# Patient Record
Sex: FEMALE | Race: WHITE | NOT HISPANIC OR LATINO | Employment: PART TIME | URBAN - METROPOLITAN AREA
[De-identification: names, ages, dates, MRNs, and addresses within clinical notes are randomized per-mention and may not be internally consistent; named-entity substitution may affect disease eponyms.]

---

## 2017-03-06 ENCOUNTER — OFFICE VISIT (OUTPATIENT)
Dept: NEUROSURGERY | Facility: CLINIC | Age: 52
End: 2017-03-06

## 2017-03-06 VITALS
DIASTOLIC BLOOD PRESSURE: 86 MMHG | WEIGHT: 156.6 LBS | BODY MASS INDEX: 26.73 KG/M2 | TEMPERATURE: 98.6 F | SYSTOLIC BLOOD PRESSURE: 138 MMHG | HEIGHT: 64 IN

## 2017-03-06 DIAGNOSIS — M50.30 DEGENERATIVE DISC DISEASE, CERVICAL: Primary | ICD-10-CM

## 2017-03-06 DIAGNOSIS — M51.36 DEGENERATIVE DISC DISEASE, LUMBAR: ICD-10-CM

## 2017-03-06 DIAGNOSIS — R29.898 WEAKNESS OF EXTREMITY: ICD-10-CM

## 2017-03-06 DIAGNOSIS — M79.602 PAIN IN BOTH UPPER EXTREMITIES: ICD-10-CM

## 2017-03-06 DIAGNOSIS — M79.601 PAIN IN BOTH UPPER EXTREMITIES: ICD-10-CM

## 2017-03-06 PROCEDURE — 99203 OFFICE O/P NEW LOW 30 MIN: CPT | Performed by: NEUROLOGICAL SURGERY

## 2017-03-06 RX ORDER — GABAPENTIN 300 MG/1
600 CAPSULE ORAL
COMMUNITY

## 2017-03-06 NOTE — PROGRESS NOTES
Stacie Petersoncharlie  1965  [id number]      Chief Complaint   Patient presents with   • Neck Pain   • Leg Pain   • Back Pain       HISTORY OF PRESENT ILLNESS:  [This is a 51-year-old female who presents with a rather complicated neurological history.  She had a meningioma and osteoma resected and .  She is had pseudoseizures and been completely evaluated at the [facility].  She has pain in the cervical and lumbar region associated with fleeting paresthesia tingling and weakness in both of her upper extremities and lower extremities.  She is had a thorough evaluation by neurology which has found a EMG/NCV to be within normal limits showing no evidence of a peripheral neuropathy.  She is had cervical and lumbar MRI is done cervical years ago showing the presence of degenerative osteoarthritis, disc degeneration and facet arthrosis.  She is referred for neurosurgical consultation because of the persistence of her symptoms.    She has been to pain management in the past which have helped.  She has seen rheumatology at the [facility] but at the present time is not on NSAIDs. ]    Past Medical History   Diagnosis Date   • Cardiac murmur    • GERD (gastroesophageal reflux disease)    • History of blood transfusion    • Malignant neoplasm of skin    • Migraine    • Ovarian cancer    • Positive tuberculin test    • Seizures        Past Surgical History   Procedure Laterality Date   • Abdominoplasty     • Bladder surgery     • Brain surgery     • Breast implant surgery     • Breast lumpectomy     •  section     • Cholecystectomy     • Knee arthroscopy w/ meniscal repair       lateral   • Rotator cuff repair     • Total abdominal hysterectomy         Family History   Problem Relation Age of Onset   • Arthritis Mother    • Cancer Mother      malignant neoplasm   • Hyperlipidemia Father    • Hypertension Father    • Heart attack Father    • Osteoporosis Other    • Thyroid disease Other    • Mental illness Other         Social History     Social History   • Marital status:      Spouse name: N/A   • Number of children: N/A   • Years of education: N/A     Occupational History   • Not on file.     Social History Main Topics   • Smoking status: Never Smoker   • Smokeless tobacco: Not on file   • Alcohol use Yes      Comment: occasionally   • Drug use: No   • Sexual activity: Defer     Other Topics Concern   • Not on file     Social History Narrative       Allergies   Allergen Reactions   • Azathioprine    • Infliximab    • Ustekinumab          Current Outpatient Prescriptions:   •  ALPRAZolam (XANAX) 0.25 MG tablet, Take 1 tablet by mouth daily as needed., Disp: , Rfl:   •  cyclobenzaprine (FLEXERIL) 10 MG tablet, Take 1 tablet by mouth 2 (two) times a day as needed., Disp: , Rfl:   •  fluticasone (FLONASE) 50 MCG/ACT nasal spray, into each nostril., Disp: , Rfl:   •  gabapentin (NEURONTIN) 300 MG capsule, Take 600 mg by mouth every night at bedtime., Disp: , Rfl:   •  levothyroxine (SYNTHROID, LEVOTHROID) 75 MCG tablet, Take 1 tablet by mouth daily. Wait 1 hour for other medication or food, Disp: , Rfl:   •  omeprazole (PriLOSEC) 40 MG capsule, Take  by mouth. Take 1 tab po qd x 2 wk then qd prn reflux, Disp: , Rfl:   •  simvastatin (ZOCOR) 20 MG tablet, Take 1 tablet by mouth daily., Disp: , Rfl:   •  valACYclovir (VALTREX) 500 MG tablet, Take 500 mg by mouth 3 (three) times a day. X7 days, Disp: , Rfl:   •  venlafaxine XR (EFFEXOR-XR) 75 MG 24 hr capsule, Take 1 capsule by mouth daily., Disp: , Rfl:   •  estradiol (ESTRACE) 1 MG tablet, Take 1 tablet by mouth daily., Disp: , Rfl:     Review of Systems   Constitutional: Positive for activity change and diaphoresis. Negative for appetite change, chills, fatigue, fever and unexpected weight change.   HENT: Positive for dental problem. Negative for congestion, drooling, ear discharge, ear pain, facial swelling, hearing loss, mouth sores, nosebleeds, postnasal drip,  "rhinorrhea, sinus pressure, sneezing, sore throat, tinnitus, trouble swallowing and voice change.    Eyes: Negative for photophobia, pain, discharge, redness, itching and visual disturbance.   Respiratory: Negative for apnea, cough, choking, chest tightness, shortness of breath, wheezing and stridor.    Cardiovascular: Positive for palpitations. Negative for chest pain and leg swelling.   Gastrointestinal: Negative for abdominal distention, abdominal pain, anal bleeding, blood in stool, constipation, diarrhea, nausea, rectal pain and vomiting.   Endocrine: Positive for cold intolerance and heat intolerance. Negative for polydipsia, polyphagia and polyuria.   Genitourinary: Negative for decreased urine volume, difficulty urinating, dysuria, enuresis, flank pain, frequency, genital sores, hematuria and urgency.   Musculoskeletal: Positive for arthralgias, back pain and myalgias. Negative for gait problem, joint swelling, neck pain and neck stiffness.   Skin: Positive for rash. Negative for color change, pallor and wound.   Allergic/Immunologic: Negative for environmental allergies, food allergies and immunocompromised state.   Neurological: Positive for seizures, weakness and numbness. Negative for dizziness, tremors, syncope, facial asymmetry, speech difficulty, light-headedness and headaches.   Hematological: Negative for adenopathy. Does not bruise/bleed easily.   Psychiatric/Behavioral: Positive for dysphoric mood. Negative for agitation, behavioral problems, confusion, decreased concentration, hallucinations, self-injury, sleep disturbance and suicidal ideas. The patient is nervous/anxious. The patient is not hyperactive.    All other systems reviewed and are negative.      Neurological Examination:    Vitals:    03/06/17 0838   BP: 138/86   BP Location: Right arm   Patient Position: Sitting   Cuff Size: Adult   Temp: 98.6 °F (37 °C)   TempSrc: Temporal Artery    Weight: 156 lb 9.6 oz (71 kg)   Height: 64\" (162.6 " cm)       Mental status/speech: The patient is alert and oriented.  Speech is clear without aphysia or dysarthria.  No overt cognitive deficits.    Cranial nerve examination:    Olfaction: Smell is intact.  Vision: Vision is intact without visual field abnormalities.  Funduscopic examination is normal.  No pupillary irregularity.  Ocular motor examination: The extraocular muscles are intact.  There is no diplopia.  The pupil is round and reactive to both light and accommodation.  There is no nystagmus.  Facial movement/sensation: There is no facial weakness.  Sensation is intact in the first, second, and third divisions of the trigeminal nerve.  The corneal reflex is intact.  Auditory: Hearing is intact to finger rub bilaterally.  Cranial nerves IX, X, XI, XII: Phonation is normal.  No dysphagia.  Tongue is protruded in the midline without atrophy.  The gag reflex is intact.  Shoulder shrug is normal.    Musculoligamentous ligamentous examination: She has decreased range of motion of the cervical lumbar spine.  Straight leg raising, [place] and flip test are negative.  I am unable to elicit Babinski Adrián or clonus.  I find no focal weakness sensory loss or reflex asymmetry.  Her gait appears to be normal.    Medical Decision Making:     Diagnostic Data Set:  MRI data set from approximately 18 months prior to this encounter show the presence of degenerative disc disease in the cervical and lumbar region.  She has mild compromise of the canal.  There is no abnormal signal within the spinal cord.      Assessment:  Degenerative osteoarthritis of the spine          Recommendations:  Her symptom complex is very difficult to elucidate and correlate with the MRI findings of 1 year ago.  These probably ought to be repeated including an MRI of the brain for completeness sake.  Her examination does not point to a focal dysfunction that would be helped with surgery.  I've ordered the appropriate studies and we'll see her in  follow-up.  I will keep you informed once I have seen those.  It may well be that she has degenerative osteoarthritis and revisit to pain management with steroid injections and NSAIDs is the appropriate therapy        I greatly appreciate the opportunity to see and evaluate this individual.  If you have questions or concerns regarding issues that I may have overlooked please call me at any time: [phone].  Mateusz Dietrich M.D.  [facility]  [address] [place] [address]    Scribed for Rosendo Dietrich MD by Elijah Kern CMA. 3/6/2017  8:53 AM    I have read and concur with the information provided by the scribe.  Rosendo Dietrich MD

## 2017-03-14 ENCOUNTER — APPOINTMENT (OUTPATIENT)
Dept: MRI IMAGING | Facility: HOSPITAL | Age: 52
End: 2017-03-14
Attending: NEUROLOGICAL SURGERY

## 2017-04-27 ENCOUNTER — TRANSCRIBE ORDERS (OUTPATIENT)
Dept: ADMINISTRATIVE | Facility: HOSPITAL | Age: 52
End: 2017-04-27

## 2017-04-27 DIAGNOSIS — Z12.31 VISIT FOR SCREENING MAMMOGRAM: Primary | ICD-10-CM

## 2017-05-02 ENCOUNTER — HOSPITAL ENCOUNTER (OUTPATIENT)
Dept: MAMMOGRAPHY | Facility: HOSPITAL | Age: 52
Discharge: HOME | End: 2017-05-02
Admitting: INTERNAL MEDICINE

## 2017-05-02 DIAGNOSIS — Z12.31 VISIT FOR SCREENING MAMMOGRAM: ICD-10-CM

## 2017-05-02 PROCEDURE — 77063 BREAST TOMOSYNTHESIS BI: CPT | Performed by: RADIOLOGY

## 2017-05-02 PROCEDURE — G0202 SCR MAMMO BI INCL CAD: HCPCS

## 2017-05-02 PROCEDURE — 77067 SCR MAMMO BI INCL CAD: CPT | Performed by: RADIOLOGY

## 2017-05-02 PROCEDURE — 77063 BREAST TOMOSYNTHESIS BI: CPT

## 2017-07-24 DIAGNOSIS — R29.898 WEAKNESS OF EXTREMITY: ICD-10-CM

## 2017-07-24 DIAGNOSIS — M51.36 DEGENERATIVE DISC DISEASE, LUMBAR: Primary | ICD-10-CM

## 2017-08-03 ENCOUNTER — OFFICE VISIT (OUTPATIENT)
Dept: NEUROSURGERY | Facility: CLINIC | Age: 52
End: 2017-08-03

## 2017-08-03 ENCOUNTER — APPOINTMENT (OUTPATIENT)
Dept: MRI IMAGING | Facility: HOSPITAL | Age: 52
End: 2017-08-03
Attending: NEUROLOGICAL SURGERY

## 2017-08-03 VITALS
HEIGHT: 64 IN | SYSTOLIC BLOOD PRESSURE: 130 MMHG | DIASTOLIC BLOOD PRESSURE: 80 MMHG | WEIGHT: 158.8 LBS | TEMPERATURE: 97.6 F | BODY MASS INDEX: 27.11 KG/M2

## 2017-08-03 DIAGNOSIS — M50.30 DEGENERATIVE DISC DISEASE, CERVICAL: ICD-10-CM

## 2017-08-03 DIAGNOSIS — M51.36 DEGENERATIVE DISC DISEASE, LUMBAR: Primary | ICD-10-CM

## 2017-08-03 PROBLEM — M51.369 DEGENERATIVE DISC DISEASE, LUMBAR: Status: ACTIVE | Noted: 2017-08-03

## 2017-08-03 PROCEDURE — 99213 OFFICE O/P EST LOW 20 MIN: CPT | Performed by: NEUROLOGICAL SURGERY

## 2017-08-03 NOTE — PATIENT INSTRUCTIONS
Call Dr. Dietrich on a Monday or Tuesday.   Ask for Lily,  and leave a message for  Dr. Dietrich.  He will call you back at the end of the day as soon as he can.     [phone]

## 2017-08-03 NOTE — PROGRESS NOTES
Stacie Petersoncharlie  1965  [id number]                       CURRENT WORKING DIAGNOSIS:  [Diffuse degenerative osteoarthritis ]         MEDICAL HISTORY SINCE LAST ENCOUNTER:  [ This 52-year-old female has had issues related to disseminated degenerative osteoarthritis as well as fibromyalgia.  A recent lumbar MRI and reports for follow-up.]           Past Medical History:   Diagnosis Date   • Cardiac murmur    • GERD (gastroesophageal reflux disease)    • History of blood transfusion    • Malignant neoplasm of skin    • Migraine    • Ovarian cancer    • Positive tuberculin test    • Seizures               Past Surgical History:   Procedure Laterality Date   • ABDOMINOPLASTY     • BLADDER SURGERY     • BRAIN SURGERY     • BREAST EXCISIONAL BIOPSY Right     Pt states that she had a bx scar but it was around her areola where her reduction scars are now    • BREAST IMPLANT SURGERY     •  SECTION     • CHOLECYSTECTOMY     • KNEE ARTHROSCOPY W/ MENISCAL REPAIR      lateral   • REDUCTION MAMMAPLASTY Bilateral    • ROTATOR CUFF REPAIR     • TOTAL ABDOMINAL HYSTERECTOMY                Family History   Problem Relation Age of Onset   • Arthritis Mother    • Cancer Mother      malignant neoplasm   • Hyperlipidemia Father    • Hypertension Father    • Heart attack Father    • Osteoporosis Other    • Thyroid disease Other    • Mental illness Other    • Breast cancer Maternal Aunt 30              Social History     Social History   • Marital status:      Spouse name: N/A   • Number of children: N/A   • Years of education: N/A     Occupational History   • Not on file.     Social History Main Topics   • Smoking status: Never Smoker   • Smokeless tobacco: Not on file   • Alcohol use Yes      Comment: occasionally   • Drug use: No   • Sexual activity: Defer     Other Topics Concern   • Not on file     Social History Narrative              Allergies   Allergen Reactions   • Adhesive Tape      Surgical adhesives   •  Azathioprine    • Remicade [Infliximab]    • Ustekinumab               Current Outpatient Prescriptions:   •  ALPRAZolam (XANAX) 0.25 MG tablet, Take 1 tablet by mouth daily as needed., Disp: , Rfl:   •  cyclobenzaprine (FLEXERIL) 10 MG tablet, Take 1 tablet by mouth 2 (two) times a day as needed., Disp: , Rfl:   •  estradiol (ESTRACE) 1 MG tablet, Take 1 tablet by mouth daily., Disp: , Rfl:   •  fluticasone (FLONASE) 50 MCG/ACT nasal spray, into each nostril., Disp: , Rfl:   •  gabapentin (NEURONTIN) 300 MG capsule, Take 600 mg by mouth every night at bedtime., Disp: , Rfl:   •  levothyroxine (SYNTHROID, LEVOTHROID) 75 MCG tablet, Take 1 tablet by mouth daily. Wait 1 hour for other medication or food, Disp: , Rfl:   •  omeprazole (PriLOSEC) 40 MG capsule, Take  by mouth. Take 1 tab po qd x 2 wk then qd prn reflux, Disp: , Rfl:   •  simvastatin (ZOCOR) 20 MG tablet, Take 1 tablet by mouth daily., Disp: , Rfl:          Review of Systems   Constitutional: Negative for activity change, appetite change, chills, diaphoresis, fatigue, fever and unexpected weight change.   HENT: Positive for postnasal drip and sinus pressure. Negative for congestion, dental problem, drooling, ear discharge, ear pain, facial swelling, hearing loss, mouth sores, nosebleeds, rhinorrhea, sneezing, sore throat, tinnitus, trouble swallowing and voice change.    Eyes: Positive for photophobia, pain, itching and visual disturbance. Negative for discharge and redness.   Respiratory: Negative for apnea, cough, choking, chest tightness, shortness of breath, wheezing and stridor.    Cardiovascular: Positive for palpitations. Negative for chest pain and leg swelling.   Gastrointestinal: Negative for abdominal distention, abdominal pain, anal bleeding, blood in stool, constipation, diarrhea, nausea, rectal pain and vomiting.   Endocrine: Positive for cold intolerance and heat intolerance. Negative for polydipsia, polyphagia and polyuria.  "  Genitourinary: Negative for decreased urine volume, difficulty urinating, dysuria, enuresis, flank pain, frequency, genital sores, hematuria and urgency.   Musculoskeletal: Positive for arthralgias, back pain, myalgias, neck pain and neck stiffness. Negative for gait problem and joint swelling.   Skin: Positive for rash. Negative for color change, pallor and wound.   Allergic/Immunologic: Negative for environmental allergies, food allergies and immunocompromised state.   Neurological: Positive for dizziness, seizures, weakness, light-headedness, numbness and headaches. Negative for tremors, syncope, facial asymmetry and speech difficulty.   Hematological: Negative for adenopathy. Does not bruise/bleed easily.   Psychiatric/Behavioral: Negative for agitation, behavioral problems, confusion, decreased concentration, dysphoric mood, hallucinations, self-injury, sleep disturbance and suicidal ideas. The patient is not nervous/anxious and is not hyperactive.                Vitals:    08/03/17 1602   BP: 130/80   Temp: 97.6 °F (36.4 °C)   Weight: 158 lb 12.8 oz (72 kg)   Height: 64\" (162.6 cm)               EXAMINATION: Straight leg raising [place] flip and Layla tests are innocent.  I find no evidence of weakness sensory loss or reflex asymmetry.            MEDICAL DECISION MAKING: The MRI of the lumbar spine shows the presence of significant degenerative disc disease particularly at L4-L5 and at L5-S1 with Modic changes in each.  The neuroforamen however appear to be widely patent.  She has less significant disease at L2-L3 and L3-L4.  The cervical MRI shows the presence of multilevel degenerative disc disease.  It is most severe at C5-C6 deviating toward the right.           ASSESSMENT/DISPOSITION: Reviewed her therapeutic options.  A 2 level ALIF carry significant risk particularly in view of the fact that she has had multiple abdominal operations some of which have been related to scar tissues.  PLIF from L3 to " S1 justifiable and due to the fact she has no radiculopathy.  I have discussed the possibility of anterior cervical disc decompression at C5-C6.  Take all this under consideration and call me when she is made her decision              I APPRECIATE THE OPPORTUNITY OF THIS REFERRAL. PLEASE CALL IF ANY QUESTIONS [phone]    Scribed for Rosendo Dietrich MD by Elijah Kern CMA. 8/3/2017  4:33 PM     I have read and concur with the information provided by the scribe.  Rosendo Dietrich MD

## 2017-08-08 ENCOUNTER — TELEPHONE (OUTPATIENT)
Dept: NEUROSURGERY | Facility: CLINIC | Age: 52
End: 2017-08-08

## 2017-08-08 DIAGNOSIS — G89.29 CHRONIC BILATERAL LOW BACK PAIN WITHOUT SCIATICA: Primary | ICD-10-CM

## 2017-08-08 DIAGNOSIS — M51.36 DDD (DEGENERATIVE DISC DISEASE), LUMBAR: ICD-10-CM

## 2017-08-08 DIAGNOSIS — M50.20 HNP (HERNIATED NUCLEUS PULPOSUS), CERVICAL: ICD-10-CM

## 2017-08-08 DIAGNOSIS — M54.50 CHRONIC BILATERAL LOW BACK PAIN WITHOUT SCIATICA: Primary | ICD-10-CM

## 2017-08-08 DIAGNOSIS — M54.2 NECK PAIN, BILATERAL: ICD-10-CM

## 2017-08-09 NOTE — TELEPHONE ENCOUNTER
This is something that Dr. Dietrich likes to see.  We can either put this in His in basket today or show him tomorrow.

## 2017-08-09 NOTE — TELEPHONE ENCOUNTER
Pt returned my call stated  told her he was going to speak with his partners and get back with her in regards to surgery.      Per  OV note on 08/03/17    ASSESSMENT/DISPOSITION: Reviewed her therapeutic options.  A 2 level ALIF carry significant risk particularly in view of the fact that she has had multiple abdominal operations some of which have been related to scar tissues.  PLIF from L3 to S1 justifiable and due to the fact she has no radiculopathy.  I have discussed the possibility of anterior cervical disc decompression at C5-C6.  Take all this under consideration and call me when she is made her decision         I'm unsure if he was going to do this, or move forward with his plan?    Please advise.

## 2017-08-16 NOTE — TELEPHONE ENCOUNTER
I have placed the orders in Epic for cervical and lumbar myelograms with flexion extension x-rays of the lumbar spine and EMG and nerve conduction studies of the arms and legs.  We'll need to call the patient to inform her of Dr. Dietrich's thoughts and if she wants to proceed to please get scheduled.    Nette Hansen PA-C

## 2017-08-16 NOTE — TELEPHONE ENCOUNTER
Reviewed studies.  She will need to have myelography and post-myelography CT scanning of the cervical and lumbar area including flexion and extension with EMG and NCV to determine the degree of surgery that may or may not be necessitated, required and helpful.  I left a message at her telephone for her to call me and schedule.

## 2017-08-29 DIAGNOSIS — M54.50 CHRONIC BILATERAL LOW BACK PAIN WITHOUT SCIATICA: ICD-10-CM

## 2017-08-29 DIAGNOSIS — M50.20 HNP (HERNIATED NUCLEUS PULPOSUS), CERVICAL: Primary | ICD-10-CM

## 2017-08-29 DIAGNOSIS — M51.36 DDD (DEGENERATIVE DISC DISEASE), LUMBAR: ICD-10-CM

## 2017-08-29 DIAGNOSIS — G89.29 CHRONIC BILATERAL LOW BACK PAIN WITHOUT SCIATICA: ICD-10-CM

## 2017-09-22 ENCOUNTER — APPOINTMENT (OUTPATIENT)
Dept: NEUROLOGY | Facility: HOSPITAL | Age: 52
End: 2017-09-22

## 2017-09-22 ENCOUNTER — HOSPITAL ENCOUNTER (OUTPATIENT)
Dept: GENERAL RADIOLOGY | Facility: HOSPITAL | Age: 52
Discharge: HOME | End: 2017-09-22
Attending: NEUROLOGICAL SURGERY

## 2018-07-23 ENCOUNTER — OFFICE VISIT (OUTPATIENT)
Dept: NEUROSURGERY | Facility: CLINIC | Age: 53
End: 2018-07-23

## 2018-07-23 VITALS
OXYGEN SATURATION: 98 % | DIASTOLIC BLOOD PRESSURE: 84 MMHG | SYSTOLIC BLOOD PRESSURE: 122 MMHG | RESPIRATION RATE: 16 BRPM | HEART RATE: 87 BPM | TEMPERATURE: 97.8 F | WEIGHT: 150 LBS | HEIGHT: 64 IN | BODY MASS INDEX: 25.61 KG/M2

## 2018-07-23 DIAGNOSIS — R20.0 NUMBNESS: ICD-10-CM

## 2018-07-23 DIAGNOSIS — M50.30 DEGENERATIVE DISC DISEASE, CERVICAL: Primary | ICD-10-CM

## 2018-07-23 DIAGNOSIS — R53.1 WEAKNESS: ICD-10-CM

## 2018-07-23 DIAGNOSIS — M51.36 DEGENERATIVE DISC DISEASE, LUMBAR: ICD-10-CM

## 2018-07-23 PROCEDURE — 99213 OFFICE O/P EST LOW 20 MIN: CPT | Performed by: NEUROLOGICAL SURGERY

## 2018-07-23 RX ORDER — ESTRADIOL 2 MG/1
RING VAGINAL
Refills: 0 | COMMUNITY
Start: 2018-06-05

## 2018-07-23 RX ORDER — RANITIDINE 150 MG/1
150 TABLET ORAL 2 TIMES DAILY
COMMUNITY

## 2018-07-23 RX ORDER — RIZATRIPTAN BENZOATE 10 MG/1
10 TABLET ORAL ONCE AS NEEDED
COMMUNITY

## 2018-07-23 RX ORDER — LEFLUNOMIDE 20 MG/1
20 TABLET ORAL DAILY
COMMUNITY

## 2018-07-23 RX ORDER — DULOXETIN HYDROCHLORIDE 60 MG/1
60 CAPSULE, DELAYED RELEASE ORAL 2 TIMES DAILY
COMMUNITY

## 2018-07-23 NOTE — PROGRESS NOTES
Stacie MYLES Shilo  1965  [id number]                       CURRENT WORKING DIAGNOSIS:   Degenerative osteoarthritis         MEDICAL HISTORY SINCE LAST ENCOUNTER:   This 53-year-old female is beginning to have more issues of pain in her neck radiating to the right upper extremity.  She was seen in orthopedist who told her that she needed to have surgery on her neck and she had compression of the spinal cord.  I have seen in the past with degenerative osteoarthritic changes but have not not appreciated the seriousness of which she has been told.  She has pain in the neck rating to the right shoulder and right upper extremity.  She also has pain in her back radiating down both lower extremity is.  Studies in the passive shown instability and spinal stenosis in the lumbar region.  There are no recent studies of this, however.           Past Medical History:   Diagnosis Date   • Cardiac murmur    • GERD (gastroesophageal reflux disease)    • History of blood transfusion    • Malignant neoplasm of skin    • Migraine    • Ovarian cancer (CMS/HCC)    • Positive tuberculin test    • Seizures (CMS/HCC)               Past Surgical History:   Procedure Laterality Date   • ABDOMINOPLASTY     • BLADDER SURGERY     • BRAIN SURGERY     • BREAST EXCISIONAL BIOPSY Right     Pt states that she had a bx scar but it was around her areola where her reduction scars are now    • BREAST IMPLANT SURGERY     •  SECTION     • CHOLECYSTECTOMY     • KNEE ARTHROSCOPY W/ MENISCAL REPAIR      lateral   • REDUCTION MAMMAPLASTY Bilateral    • ROTATOR CUFF REPAIR     • TOTAL ABDOMINAL HYSTERECTOMY                Family History   Problem Relation Age of Onset   • Arthritis Mother    • Cancer Mother         malignant neoplasm   • Hyperlipidemia Father    • Hypertension Father    • Heart attack Father    • Osteoporosis Other    • Thyroid disease Other    • Mental illness Other    • Breast cancer Maternal Aunt 30              Social History      Social History   • Marital status:      Spouse name: N/A   • Number of children: N/A   • Years of education: N/A     Occupational History   • Not on file.     Social History Main Topics   • Smoking status: Never Smoker   • Smokeless tobacco: Not on file   • Alcohol use Yes      Comment: occasionally   • Drug use: No   • Sexual activity: Defer     Other Topics Concern   • Not on file     Social History Narrative   • No narrative on file              Allergies   Allergen Reactions   • Adhesive Tape      Surgical adhesives   • Azathioprine    • Remicade [Infliximab]    • Ustekinumab               Current Outpatient Prescriptions:   •  ALPRAZolam (XANAX) 0.25 MG tablet, Take 1 tablet by mouth daily as needed., Disp: , Rfl:   •  cyclobenzaprine (FLEXERIL) 10 MG tablet, Take 1 tablet by mouth 2 (two) times a day as needed., Disp: , Rfl:   •  DULoxetine (CYMBALTA) 60 MG capsule, Take 60 mg by mouth 2 (Two) Times a Day., Disp: , Rfl:   •  ESTRING 2 MG vaginal ring, INSERT 1 RING INTRAVAGINALLY EVERY 3 MONTHS AS DIRECTED, Disp: , Rfl: 0  •  fluticasone (FLONASE) 50 MCG/ACT nasal spray, into each nostril., Disp: , Rfl:   •  gabapentin (NEURONTIN) 300 MG capsule, Take 600 mg by mouth every night at bedtime., Disp: , Rfl:   •  leflunomide (ARAVA) 20 MG tablet, Take 20 mg by mouth Daily., Disp: , Rfl:   •  levothyroxine (SYNTHROID, LEVOTHROID) 75 MCG tablet, Take 1 tablet by mouth daily. Wait 1 hour for other medication or food, Disp: , Rfl:   •  raNITIdine (ZANTAC) 150 MG tablet, Take 150 mg by mouth 2 (Two) Times a Day., Disp: , Rfl:   •  rizatriptan (MAXALT) 10 MG tablet, Take 10 mg by mouth 1 (One) Time As Needed for Migraine. May repeat in 2 hours if needed, Disp: , Rfl:   •  simvastatin (ZOCOR) 20 MG tablet, Take 1 tablet by mouth daily., Disp: , Rfl:   •  omeprazole (PriLOSEC) 40 MG capsule, Take  by mouth. Take 1 tab po qd x 2 wk then qd prn reflux, Disp: , Rfl:          Review of Systems   HENT: Positive for  "postnasal drip.    Eyes: Positive for visual disturbance.   Cardiovascular: Positive for palpitations.   Endocrine: Positive for cold intolerance and heat intolerance.   Musculoskeletal: Positive for arthralgias, back pain, joint swelling, myalgias, neck pain and neck stiffness.   Skin: Positive for rash.   Allergic/Immunologic: Positive for immunocompromised state.   Neurological: Positive for dizziness, weakness, light-headedness, numbness and headaches.               Vitals:    07/23/18 1333   BP: 122/84   Pulse: 87   Resp: 16   Temp: 97.8 °F (36.6 °C)   TempSrc: Temporal Artery    SpO2: 98%   Weight: 68 kg (150 lb)   Height: 162.6 cm (64\")               EXAMINATION: She has slight decreased range of motion cervical spine.  However there is no weakness sensory loss or reflex asymmetry.  She has some pain with active and passive motion of her right shoulder.  He had decreased range of motion lumbar spine and a mildly positive straight leg raising.            MEDICAL DECISION MAKING: The MRI shows cervical spondylitic changes C5 to C6 and C6/C7.  She has a moderate canal stenosis.  She has neural foraminal narrowing more so on the left than the right however.           ASSESSMENT/DISPOSITION: [I've recommended additional studies to include cervical and lumbar myelography, post-myelography CT scanning and EMG and NCV of the right upper extremity. ]              I APPRECIATE THE OPPORTUNITY OF THIS REFERRAL. PLEASE CALL IF ANY       QUESTIONS [phone]    Scribed for Rosendo Dietrich MD by Elijah Kern CMA. 7/23/2018  1:44 PM    I have read and concur with the information provided by the scribe.  Rosendo Dietrich MD    "

## 2018-09-14 ENCOUNTER — APPOINTMENT (OUTPATIENT)
Dept: NEUROLOGY | Facility: HOSPITAL | Age: 53
End: 2018-09-14
Attending: NEUROLOGICAL SURGERY

## 2018-09-14 ENCOUNTER — APPOINTMENT (OUTPATIENT)
Dept: GENERAL RADIOLOGY | Facility: HOSPITAL | Age: 53
End: 2018-09-14
Attending: NEUROLOGICAL SURGERY

## 2018-09-21 ENCOUNTER — HOSPITAL ENCOUNTER (OUTPATIENT)
Dept: GENERAL RADIOLOGY | Facility: HOSPITAL | Age: 53
Discharge: HOME | End: 2018-09-21

## 2018-09-21 ENCOUNTER — HOSPITAL ENCOUNTER (OUTPATIENT)
Dept: GENERAL RADIOLOGY | Facility: HOSPITAL | Age: 53
Discharge: HOME | End: 2018-09-21
Attending: NEUROLOGICAL SURGERY | Admitting: NEUROLOGICAL SURGERY

## 2018-09-21 ENCOUNTER — HOSPITAL ENCOUNTER (OUTPATIENT)
Dept: NEUROLOGY | Facility: HOSPITAL | Age: 53
Discharge: HOME | End: 2018-09-21
Attending: NEUROLOGICAL SURGERY

## 2018-09-21 ENCOUNTER — HOSPITAL ENCOUNTER (OUTPATIENT)
Dept: CT IMAGING | Facility: HOSPITAL | Age: 53
Discharge: HOME | End: 2018-09-21

## 2018-09-21 VITALS
HEART RATE: 91 BPM | BODY MASS INDEX: 26.65 KG/M2 | TEMPERATURE: 97.9 F | RESPIRATION RATE: 18 BRPM | OXYGEN SATURATION: 95 % | SYSTOLIC BLOOD PRESSURE: 107 MMHG | HEIGHT: 63 IN | WEIGHT: 150.4 LBS | DIASTOLIC BLOOD PRESSURE: 82 MMHG

## 2018-09-21 PROCEDURE — 72132 CT LUMBAR SPINE W/DYE: CPT

## 2018-09-21 PROCEDURE — 63710000001 DIPHENHYDRAMINE PER 50 MG: Performed by: NEUROLOGICAL SURGERY

## 2018-09-21 PROCEDURE — 95910 NRV CNDJ TEST 7-8 STUDIES: CPT

## 2018-09-21 PROCEDURE — 72126 CT NECK SPINE W/DYE: CPT

## 2018-09-21 PROCEDURE — 62305 MYELOGRAPHY LUMBAR INJECTION: CPT

## 2018-09-21 PROCEDURE — 95886 MUSC TEST DONE W/N TEST COMP: CPT

## 2018-09-21 PROCEDURE — 72120 X-RAY BEND ONLY L-S SPINE: CPT

## 2018-09-21 PROCEDURE — 72240 MYELOGRAPHY NECK SPINE: CPT

## 2018-09-21 PROCEDURE — 25010000002 IOPAMIDOL 61 % SOLUTION: Performed by: NEUROLOGICAL SURGERY

## 2018-09-21 RX ORDER — LIDOCAINE HYDROCHLORIDE 10 MG/ML
10 INJECTION, SOLUTION EPIDURAL; INFILTRATION; INTRACAUDAL; PERINEURAL ONCE
Status: COMPLETED | OUTPATIENT
Start: 2018-09-21 | End: 2018-09-21

## 2018-09-21 RX ORDER — DIAZEPAM 5 MG/1
10 TABLET ORAL ONCE AS NEEDED
Status: DISCONTINUED | OUTPATIENT
Start: 2018-09-21 | End: 2018-09-22 | Stop reason: HOSPADM

## 2018-09-21 RX ORDER — DIPHENHYDRAMINE HCL 50 MG
50 CAPSULE ORAL ONCE
Status: COMPLETED | OUTPATIENT
Start: 2018-09-21 | End: 2018-09-21

## 2018-09-21 RX ADMIN — IOPAMIDOL 12 ML: 612 INJECTION, SOLUTION INTRAVENOUS at 10:25

## 2018-09-21 RX ADMIN — LIDOCAINE HYDROCHLORIDE 10 ML: 10 INJECTION, SOLUTION EPIDURAL; INFILTRATION; INTRACAUDAL; PERINEURAL at 10:25

## 2018-09-21 RX ADMIN — DIAZEPAM 10 MG: 5 TABLET ORAL at 08:03

## 2018-09-21 RX ADMIN — DIPHENHYDRAMINE HYDROCHLORIDE 50 MG: 50 CAPSULE ORAL at 08:04

## 2018-09-21 NOTE — NURSING NOTE
Dr Dietrich called that pt could be discharged and he will call results. Given printed and oral discharge instructions. Discharged per wheelchair to front entrance with  driving.

## 2018-09-21 NOTE — DISCHARGE INSTR - ACTIVITY
Rest quietly at home today.  You may resume light activity tomorrow as tolerated.  You may shower and remove the bandage tomorrow.  Lie in bed, on a couch, or in a recliner in the reclined position until tomorrow am.

## 2018-09-21 NOTE — POST-PROCEDURE NOTE
Radiology Procedure    Pre-procedure: procedure, risks discussed with patient. Patient indicated understanding and consented to procedure     Procedure Performed: total myelogram     IV Sedation and/or Anesthesia:  No    Complications: none    Preliminary Findings: pending    Specimen Removed: none    Estimated Blood Loss:  0ml    Post-Procedure Diagnosis: pending    Post-Procedure Plan: ct C, L spine, encourage fluids, bed rest x 2 hours    Standard Discharge Instructions Given:yes     ISMAEL Saunders  09/21/18  10:07 AM

## 2018-09-24 ENCOUNTER — TELEPHONE (OUTPATIENT)
Dept: INFUSION THERAPY | Facility: HOSPITAL | Age: 53
End: 2018-09-24

## 2018-09-24 NOTE — PROGRESS NOTES
NEUROSURGERY POST MYELOGRAM NOTE:    This is a 51-year-old female who presents with a rather complicated neurological history.  She had a meningioma and osteoma resected and 2004.  She is had pseudoseizures and been completely evaluated at the [facility].  She has pain in the cervical and lumbar region associated with fleeting paresthesia tingling and weakness in both of her upper extremities and lower extremities.  She is had a thorough evaluation by neurology which has found a EMG/NCV to be within normal limits showing no evidence of a peripheral neuropathy.  She is had cervical and lumbar MRI is done cervical years ago showing the presence of degenerative osteoarthritis, disc degeneration and facet arthrosis.  She is referred for neurosurgical consultation because of the persistence of her symptoms.     She has been to pain management in the past which have helped.  She has seen rheumatology at the [facility] but at the present time is not on NSAIDs    EMG/NCV IMPRESSION:      Median neuropathy at the wrist bilaterally, mild (carpal tunnel)      No electrophysiologic evidence for radiculopathy is seen in either arm    MYELOGRAM ( CERVICAL AND LUMBAR)     Mild canal stenosis at C5-6 and C6-7. Borderline canal  stenosis at C4-5; mild multilevel bony foraminal narrowing. Please see  above complete description by disc level    Multilevel, generally mild canal stenosis, from L2-3 through  L5-S1 due to disc protrusion. No large focal HNP, high-grade canal or  foraminal stenosis, acute or healing trauma is seen. Advanced L4-5  degenerative disc change is noted.    IMPRESSION/RECOMMENDATION:    These studies indicate that surgery is not a therapeutic option.  Nevertheless, she does have diffuse and widespread degenerative osteoarthritis of the cervical and lumbar spine.  She is under the care of rheumatology which seems most appropriate.  I will see her on as-needed basis.  I reviewed these data with her in detail.

## 2019-01-11 ENCOUNTER — TELEPHONE (OUTPATIENT)
Dept: NEUROSURGERY | Facility: CLINIC | Age: 54
End: 2019-01-11

## 2020-11-04 NOTE — TELEPHONE ENCOUNTER
Provider:  Karlo  Caller: self  Time of call: 9:24    Phone #:  [phone]  Surgery: n/a   Surgery Date:    Last visit: 7/23/18    Next visit: VINOD WEBSTER:         Reason for call:       Patient requests that a medical release form be faxed to her at [phone].    Form faxed successfully       
Stable

## 2021-10-29 ENCOUNTER — TRANSCRIBE ORDERS (OUTPATIENT)
Dept: ADMINISTRATIVE | Facility: HOSPITAL | Age: 56
End: 2021-10-29

## 2021-10-29 DIAGNOSIS — Z11.59 ENCOUNTER FOR SCREENING FOR VIRAL DISEASE: ICD-10-CM

## 2021-10-29 DIAGNOSIS — Z11.59 SCREENING EXAMINATION FOR POLIOMYELITIS: Primary | ICD-10-CM

## 2021-11-08 ENCOUNTER — LAB (OUTPATIENT)
Dept: PREADMISSION TESTING | Facility: HOSPITAL | Age: 56
End: 2021-11-08

## 2021-11-08 LAB — SARS-COV-2 RNA PNL SPEC NAA+PROBE: NOT DETECTED

## 2021-11-08 PROCEDURE — U0004 COV-19 TEST NON-CDC HGH THRU: HCPCS

## 2021-11-08 PROCEDURE — C9803 HOPD COVID-19 SPEC COLLECT: HCPCS

## 2021-11-10 ENCOUNTER — LAB REQUISITION (OUTPATIENT)
Dept: LAB | Facility: HOSPITAL | Age: 56
End: 2021-11-10

## 2021-11-10 DIAGNOSIS — D17.1 BENIGN LIPOMATOUS NEOPLASM OF SKIN AND SUBCUTANEOUS TISSUE OF TRUNK: ICD-10-CM

## 2021-11-10 PROCEDURE — 88304 TISSUE EXAM BY PATHOLOGIST: CPT | Performed by: SURGERY

## 2021-11-11 LAB
CYTO UR: NORMAL
LAB AP CASE REPORT: NORMAL
LAB AP CLINICAL INFORMATION: NORMAL
PATH REPORT.FINAL DX SPEC: NORMAL
PATH REPORT.GROSS SPEC: NORMAL

## 2025-07-22 ENCOUNTER — APPOINTMENT (OUTPATIENT)
Facility: HOSPITAL | Age: 60
End: 2025-07-22
Payer: MEDICARE

## 2025-07-22 ENCOUNTER — HOSPITAL ENCOUNTER (INPATIENT)
Facility: HOSPITAL | Age: 60
LOS: 1 days | Discharge: HOME | End: 2025-07-23
Attending: EMERGENCY MEDICINE | Admitting: INTERNAL MEDICINE
Payer: MEDICARE

## 2025-07-22 DIAGNOSIS — J36 TONSILLAR ABSCESS: Primary | ICD-10-CM

## 2025-07-22 PROBLEM — L40.50 ARTHROPATHIC PSORIASIS, UNSPECIFIED: Chronic | Status: ACTIVE | Noted: 2021-11-04

## 2025-07-22 PROBLEM — D84.9 IMMUNOCOMPROMISED STATE: Chronic | Status: ACTIVE | Noted: 2020-02-11

## 2025-07-22 PROBLEM — M35.00 SJOGREN SYNDROME, UNSPECIFIED: Chronic | Status: ACTIVE | Noted: 2021-05-06

## 2025-07-22 PROBLEM — L40.0 PLAQUE PSORIASIS: Status: ACTIVE | Noted: 2023-12-19

## 2025-07-22 PROBLEM — I34.1 MITRAL VALVE PROLAPSE: Status: ACTIVE | Noted: 2023-02-14

## 2025-07-22 LAB
ALBUMIN SERPL-MCNC: 4.6 G/DL (ref 3.5–5.2)
ALBUMIN/GLOB SERPL: 2.3 G/DL
ALP SERPL-CCNC: 69 U/L (ref 39–117)
ALT SERPL W P-5'-P-CCNC: 38 U/L (ref 1–33)
ANION GAP SERPL CALCULATED.3IONS-SCNC: 14 MMOL/L (ref 5–15)
AST SERPL-CCNC: 44 U/L (ref 1–32)
BASOPHILS # BLD AUTO: 0.03 10*3/MM3 (ref 0–0.2)
BASOPHILS NFR BLD AUTO: 0.5 % (ref 0–1.5)
BILIRUB SERPL-MCNC: 0.6 MG/DL (ref 0–1.2)
BUN SERPL-MCNC: 19.2 MG/DL (ref 8–23)
BUN/CREAT SERPL: 21.3 (ref 7–25)
CALCIUM SPEC-SCNC: 9.9 MG/DL (ref 8.6–10.5)
CHLORIDE SERPL-SCNC: 106 MMOL/L (ref 98–107)
CO2 SERPL-SCNC: 24 MMOL/L (ref 22–29)
CREAT SERPL-MCNC: 0.9 MG/DL (ref 0.57–1)
D-LACTATE SERPL-SCNC: 0.7 MMOL/L (ref 0.5–2)
DEPRECATED RDW RBC AUTO: 49.6 FL (ref 37–54)
EGFRCR SERPLBLD CKD-EPI 2021: 73.3 ML/MIN/1.73
EOSINOPHIL # BLD AUTO: 0.02 10*3/MM3 (ref 0–0.4)
EOSINOPHIL NFR BLD AUTO: 0.3 % (ref 0.3–6.2)
ERYTHROCYTE [DISTWIDTH] IN BLOOD BY AUTOMATED COUNT: 15.1 % (ref 12.3–15.4)
GLOBULIN UR ELPH-MCNC: 2 GM/DL
GLUCOSE SERPL-MCNC: 93 MG/DL (ref 65–99)
HCT VFR BLD AUTO: 35.1 % (ref 34–46.6)
HGB BLD-MCNC: 11.5 G/DL (ref 12–15.9)
IMM GRANULOCYTES # BLD AUTO: 0 10*3/MM3 (ref 0–0.05)
IMM GRANULOCYTES NFR BLD AUTO: 0 % (ref 0–0.5)
LYMPHOCYTES # BLD AUTO: 1.69 10*3/MM3 (ref 0.7–3.1)
LYMPHOCYTES NFR BLD AUTO: 27.3 % (ref 19.6–45.3)
MCH RBC QN AUTO: 28.9 PG (ref 26.6–33)
MCHC RBC AUTO-ENTMCNC: 32.8 G/DL (ref 31.5–35.7)
MCV RBC AUTO: 88.2 FL (ref 79–97)
MONOCYTES # BLD AUTO: 0.48 10*3/MM3 (ref 0.1–0.9)
MONOCYTES NFR BLD AUTO: 7.7 % (ref 5–12)
NEUTROPHILS NFR BLD AUTO: 3.98 10*3/MM3 (ref 1.7–7)
NEUTROPHILS NFR BLD AUTO: 64.2 % (ref 42.7–76)
PLATELET # BLD AUTO: 211 10*3/MM3 (ref 140–450)
PMV BLD AUTO: 11.8 FL (ref 6–12)
POTASSIUM SERPL-SCNC: 4 MMOL/L (ref 3.5–5.2)
PROCALCITONIN SERPL-MCNC: 0.02 NG/ML (ref 0–0.25)
PROT SERPL-MCNC: 6.6 G/DL (ref 6–8.5)
RBC # BLD AUTO: 3.98 10*6/MM3 (ref 3.77–5.28)
SODIUM SERPL-SCNC: 144 MMOL/L (ref 136–145)
WBC NRBC COR # BLD AUTO: 6.2 10*3/MM3 (ref 3.4–10.8)

## 2025-07-22 PROCEDURE — 84145 PROCALCITONIN (PCT): CPT | Performed by: EMERGENCY MEDICINE

## 2025-07-22 PROCEDURE — 85025 COMPLETE CBC W/AUTO DIFF WBC: CPT | Performed by: EMERGENCY MEDICINE

## 2025-07-22 PROCEDURE — 25010000002 HYDROMORPHONE PER 4 MG: Performed by: EMERGENCY MEDICINE

## 2025-07-22 PROCEDURE — 25010000002 DIPHENHYDRAMINE PER 50 MG: Performed by: EMERGENCY MEDICINE

## 2025-07-22 PROCEDURE — 86160 COMPLEMENT ANTIGEN: CPT | Performed by: EMERGENCY MEDICINE

## 2025-07-22 PROCEDURE — 25010000002 DEXAMETHASONE PER 1 MG: Performed by: EMERGENCY MEDICINE

## 2025-07-22 PROCEDURE — 25010000002 KETOROLAC TROMETHAMINE PER 15 MG: Performed by: EMERGENCY MEDICINE

## 2025-07-22 PROCEDURE — 80053 COMPREHEN METABOLIC PANEL: CPT | Performed by: EMERGENCY MEDICINE

## 2025-07-22 PROCEDURE — 25010000002 AMPICILLIN-SULBACTAM PER 1.5 G: Performed by: STUDENT IN AN ORGANIZED HEALTH CARE EDUCATION/TRAINING PROGRAM

## 2025-07-22 PROCEDURE — G0378 HOSPITAL OBSERVATION PER HR: HCPCS

## 2025-07-22 PROCEDURE — 83605 ASSAY OF LACTIC ACID: CPT | Performed by: EMERGENCY MEDICINE

## 2025-07-22 PROCEDURE — 70491 CT SOFT TISSUE NECK W/DYE: CPT

## 2025-07-22 PROCEDURE — 25810000003 LACTATED RINGERS SOLUTION: Performed by: EMERGENCY MEDICINE

## 2025-07-22 PROCEDURE — 99222 1ST HOSP IP/OBS MODERATE 55: CPT | Performed by: STUDENT IN AN ORGANIZED HEALTH CARE EDUCATION/TRAINING PROGRAM

## 2025-07-22 PROCEDURE — 25510000001 IOPAMIDOL 61 % SOLUTION: Performed by: EMERGENCY MEDICINE

## 2025-07-22 PROCEDURE — 99285 EMERGENCY DEPT VISIT HI MDM: CPT | Performed by: EMERGENCY MEDICINE

## 2025-07-22 PROCEDURE — 25010000002 AMPICILLIN-SULBACTAM PER 1.5 G: Performed by: EMERGENCY MEDICINE

## 2025-07-22 RX ORDER — ACETAMINOPHEN 160 MG/5ML
650 SOLUTION ORAL EVERY 4 HOURS PRN
Status: DISCONTINUED | OUTPATIENT
Start: 2025-07-22 | End: 2025-07-23 | Stop reason: HOSPADM

## 2025-07-22 RX ORDER — BISACODYL 5 MG/1
5 TABLET, DELAYED RELEASE ORAL DAILY PRN
Status: DISCONTINUED | OUTPATIENT
Start: 2025-07-22 | End: 2025-07-23 | Stop reason: HOSPADM

## 2025-07-22 RX ORDER — SODIUM CHLORIDE 9 MG/ML
40 INJECTION, SOLUTION INTRAVENOUS AS NEEDED
Status: DISCONTINUED | OUTPATIENT
Start: 2025-07-22 | End: 2025-07-23 | Stop reason: HOSPADM

## 2025-07-22 RX ORDER — PANTOPRAZOLE SODIUM 40 MG/1
40 TABLET, DELAYED RELEASE ORAL
Status: DISCONTINUED | OUTPATIENT
Start: 2025-07-23 | End: 2025-07-23 | Stop reason: HOSPADM

## 2025-07-22 RX ORDER — IPRATROPIUM BROMIDE AND ALBUTEROL SULFATE 2.5; .5 MG/3ML; MG/3ML
3 SOLUTION RESPIRATORY (INHALATION) EVERY 4 HOURS PRN
Status: DISCONTINUED | OUTPATIENT
Start: 2025-07-22 | End: 2025-07-23 | Stop reason: HOSPADM

## 2025-07-22 RX ORDER — SODIUM CHLORIDE 0.9 % (FLUSH) 0.9 %
10 SYRINGE (ML) INJECTION EVERY 12 HOURS SCHEDULED
Status: DISCONTINUED | OUTPATIENT
Start: 2025-07-22 | End: 2025-07-23 | Stop reason: HOSPADM

## 2025-07-22 RX ORDER — POLYETHYLENE GLYCOL 3350 17 G/17G
17 POWDER, FOR SOLUTION ORAL DAILY PRN
Status: DISCONTINUED | OUTPATIENT
Start: 2025-07-22 | End: 2025-07-23 | Stop reason: HOSPADM

## 2025-07-22 RX ORDER — SODIUM CHLORIDE, SODIUM LACTATE, POTASSIUM CHLORIDE, CALCIUM CHLORIDE 600; 310; 30; 20 MG/100ML; MG/100ML; MG/100ML; MG/100ML
100 INJECTION, SOLUTION INTRAVENOUS CONTINUOUS
Status: ACTIVE | OUTPATIENT
Start: 2025-07-22 | End: 2025-07-23

## 2025-07-22 RX ORDER — HYDROMORPHONE HYDROCHLORIDE 1 MG/ML
0.5 INJECTION, SOLUTION INTRAMUSCULAR; INTRAVENOUS; SUBCUTANEOUS ONCE
Refills: 0 | Status: COMPLETED | OUTPATIENT
Start: 2025-07-22 | End: 2025-07-22

## 2025-07-22 RX ORDER — LEFLUNOMIDE 10 MG/1
20 TABLET ORAL DAILY
Status: DISCONTINUED | OUTPATIENT
Start: 2025-07-23 | End: 2025-07-23 | Stop reason: HOSPADM

## 2025-07-22 RX ORDER — DULOXETIN HYDROCHLORIDE 60 MG/1
60 CAPSULE, DELAYED RELEASE ORAL 2 TIMES DAILY
Status: DISCONTINUED | OUTPATIENT
Start: 2025-07-22 | End: 2025-07-23 | Stop reason: HOSPADM

## 2025-07-22 RX ORDER — KETOROLAC TROMETHAMINE 15 MG/ML
15 INJECTION, SOLUTION INTRAMUSCULAR; INTRAVENOUS ONCE
Status: COMPLETED | OUTPATIENT
Start: 2025-07-22 | End: 2025-07-22

## 2025-07-22 RX ORDER — DIPHENHYDRAMINE HYDROCHLORIDE 50 MG/ML
50 INJECTION, SOLUTION INTRAMUSCULAR; INTRAVENOUS ONCE
Status: COMPLETED | OUTPATIENT
Start: 2025-07-22 | End: 2025-07-22

## 2025-07-22 RX ORDER — ROSUVASTATIN CALCIUM 10 MG/1
5 TABLET, COATED ORAL NIGHTLY
Status: DISCONTINUED | OUTPATIENT
Start: 2025-07-22 | End: 2025-07-23 | Stop reason: HOSPADM

## 2025-07-22 RX ORDER — DOXEPIN HYDROCHLORIDE 10 MG/1
10 CAPSULE ORAL NIGHTLY
Status: DISCONTINUED | OUTPATIENT
Start: 2025-07-22 | End: 2025-07-23 | Stop reason: HOSPADM

## 2025-07-22 RX ORDER — LEVOTHYROXINE SODIUM 75 UG/1
75 TABLET ORAL
Status: DISCONTINUED | OUTPATIENT
Start: 2025-07-23 | End: 2025-07-23 | Stop reason: HOSPADM

## 2025-07-22 RX ORDER — SODIUM CHLORIDE 0.9 % (FLUSH) 0.9 %
10 SYRINGE (ML) INJECTION AS NEEDED
Status: DISCONTINUED | OUTPATIENT
Start: 2025-07-22 | End: 2025-07-23 | Stop reason: HOSPADM

## 2025-07-22 RX ORDER — IOPAMIDOL 612 MG/ML
85 INJECTION, SOLUTION INTRAVASCULAR
Status: COMPLETED | OUTPATIENT
Start: 2025-07-22 | End: 2025-07-22

## 2025-07-22 RX ORDER — ACETAMINOPHEN 325 MG/1
650 TABLET ORAL EVERY 4 HOURS PRN
Status: DISCONTINUED | OUTPATIENT
Start: 2025-07-22 | End: 2025-07-23 | Stop reason: HOSPADM

## 2025-07-22 RX ORDER — DEXAMETHASONE SODIUM PHOSPHATE 4 MG/ML
8 INJECTION, SOLUTION INTRA-ARTICULAR; INTRALESIONAL; INTRAMUSCULAR; INTRAVENOUS; SOFT TISSUE ONCE
Status: COMPLETED | OUTPATIENT
Start: 2025-07-22 | End: 2025-07-22

## 2025-07-22 RX ORDER — MORPHINE SULFATE 2 MG/ML
1 INJECTION, SOLUTION INTRAMUSCULAR; INTRAVENOUS EVERY 4 HOURS PRN
Status: DISCONTINUED | OUTPATIENT
Start: 2025-07-22 | End: 2025-07-23 | Stop reason: HOSPADM

## 2025-07-22 RX ORDER — FLUTICASONE PROPIONATE 50 MCG
2 SPRAY, SUSPENSION (ML) NASAL AS NEEDED
Status: DISCONTINUED | OUTPATIENT
Start: 2025-07-22 | End: 2025-07-23 | Stop reason: HOSPADM

## 2025-07-22 RX ORDER — ACETAMINOPHEN 650 MG/1
650 SUPPOSITORY RECTAL EVERY 4 HOURS PRN
Status: DISCONTINUED | OUTPATIENT
Start: 2025-07-22 | End: 2025-07-23 | Stop reason: HOSPADM

## 2025-07-22 RX ORDER — BISACODYL 10 MG
10 SUPPOSITORY, RECTAL RECTAL DAILY PRN
Status: DISCONTINUED | OUTPATIENT
Start: 2025-07-22 | End: 2025-07-23 | Stop reason: HOSPADM

## 2025-07-22 RX ORDER — ROSUVASTATIN CALCIUM 5 MG/1
5 TABLET, COATED ORAL NIGHTLY
COMMUNITY

## 2025-07-22 RX ORDER — NALOXONE HCL 0.4 MG/ML
0.4 VIAL (ML) INJECTION
Status: DISCONTINUED | OUTPATIENT
Start: 2025-07-22 | End: 2025-07-23 | Stop reason: HOSPADM

## 2025-07-22 RX ORDER — AMOXICILLIN 250 MG
2 CAPSULE ORAL 2 TIMES DAILY PRN
Status: DISCONTINUED | OUTPATIENT
Start: 2025-07-22 | End: 2025-07-23 | Stop reason: HOSPADM

## 2025-07-22 RX ORDER — DOXEPIN HYDROCHLORIDE 10 MG/1
10 CAPSULE ORAL NIGHTLY
COMMUNITY

## 2025-07-22 RX ORDER — CHOLECALCIFEROL (VITAMIN D3) 25 MCG
2000 TABLET ORAL DAILY
COMMUNITY

## 2025-07-22 RX ORDER — GABAPENTIN 300 MG/1
600 CAPSULE ORAL NIGHTLY
Status: DISCONTINUED | OUTPATIENT
Start: 2025-07-22 | End: 2025-07-23 | Stop reason: HOSPADM

## 2025-07-22 RX ORDER — TRANEXAMIC ACID 10 MG/ML
1000 INJECTION, SOLUTION INTRAVENOUS ONCE
Status: COMPLETED | OUTPATIENT
Start: 2025-07-22 | End: 2025-07-22

## 2025-07-22 RX ORDER — LIDOCAINE HYDROCHLORIDE 20 MG/ML
10 SOLUTION OROPHARYNGEAL ONCE
Status: COMPLETED | OUTPATIENT
Start: 2025-07-22 | End: 2025-07-22

## 2025-07-22 RX ORDER — LIDOCAINE HYDROCHLORIDE 20 MG/ML
10 SOLUTION OROPHARYNGEAL
Status: DISCONTINUED | OUTPATIENT
Start: 2025-07-22 | End: 2025-07-23 | Stop reason: HOSPADM

## 2025-07-22 RX ADMIN — TRANEXAMIC ACID 1000 MG: 10 INJECTION, SOLUTION INTRAVENOUS at 13:49

## 2025-07-22 RX ADMIN — Medication 10 MG: at 23:42

## 2025-07-22 RX ADMIN — DIPHENHYDRAMINE HYDROCHLORIDE 50 MG: 50 INJECTION INTRAMUSCULAR; INTRAVENOUS at 13:31

## 2025-07-22 RX ADMIN — AMPICILLIN SODIUM AND SULBACTAM SODIUM 3 G: 2; 1 INJECTION, POWDER, FOR SOLUTION INTRAMUSCULAR; INTRAVENOUS at 22:29

## 2025-07-22 RX ADMIN — SODIUM CHLORIDE, POTASSIUM CHLORIDE, SODIUM LACTATE AND CALCIUM CHLORIDE 500 ML: 600; 310; 30; 20 INJECTION, SOLUTION INTRAVENOUS at 13:36

## 2025-07-22 RX ADMIN — HYDROMORPHONE HYDROCHLORIDE 0.5 MG: 1 INJECTION, SOLUTION INTRAMUSCULAR; INTRAVENOUS; SUBCUTANEOUS at 13:33

## 2025-07-22 RX ADMIN — GABAPENTIN 600 MG: 300 CAPSULE ORAL at 23:42

## 2025-07-22 RX ADMIN — DEXAMETHASONE SODIUM PHOSPHATE 8 MG: 4 INJECTION, SOLUTION INTRAMUSCULAR; INTRAVENOUS at 13:28

## 2025-07-22 RX ADMIN — AMPICILLIN SODIUM AND SULBACTAM SODIUM 3 G: 2; 1 INJECTION, POWDER, FOR SOLUTION INTRAMUSCULAR; INTRAVENOUS at 15:36

## 2025-07-22 RX ADMIN — LIDOCAINE HYDROCHLORIDE 10 ML: 20 SOLUTION ORAL at 15:39

## 2025-07-22 RX ADMIN — HYDROMORPHONE HYDROCHLORIDE 0.5 MG: 1 INJECTION, SOLUTION INTRAMUSCULAR; INTRAVENOUS; SUBCUTANEOUS at 15:42

## 2025-07-22 RX ADMIN — IOPAMIDOL 75 ML: 612 INJECTION, SOLUTION INTRAVENOUS at 14:01

## 2025-07-22 RX ADMIN — KETOROLAC TROMETHAMINE 15 MG: 15 INJECTION INTRAMUSCULAR at 15:40

## 2025-07-22 NOTE — ED NOTES
Stacie Lao    Nursing Report ED to Floor:  Mental status: a/o x 4  Ambulatory status: at will  Oxygen Therapy:  ra  Cardiac Rhythm: sinus tach  Admitted from: home  Safety Concerns:  fall risk  Precautions: none  Social Issues: unknown  ED Room #:  21    ED Nurse Phone Extension - [phone]      HPI:   Chief Complaint   Patient presents with    Oral Swelling       Past Medical History:  Past Medical History:   Diagnosis Date    Cardiac murmur     GERD (gastroesophageal reflux disease)     History of blood transfusion     Malignant neoplasm of skin     Migraine     Ovarian cancer     Positive tuberculin test     Seizures         Past Surgical History:  Past Surgical History:   Procedure Laterality Date    ABDOMINOPLASTY      BLADDER SURGERY      BRAIN SURGERY      BREAST EXCISIONAL BIOPSY Right     Pt states that she had a bx scar but it was around her areola where her reduction scars are now     BREAST IMPLANT SURGERY       SECTION      CHOLECYSTECTOMY      KNEE ARTHROSCOPY W/ MENISCAL REPAIR      lateral    REDUCTION MAMMAPLASTY Bilateral 2001    ROTATOR CUFF REPAIR      TOTAL ABDOMINAL HYSTERECTOMY          Admitting Doctor:   No admitting provider for patient encounter.    Consulting Provider(s):  Consults       No orders found from 2025 to 2025.             Admitting Diagnosis:   The encounter diagnosis was Tonsillar abscess.    Most Recent Vitals:   Vitals:    25 1543 25 1553 25 1600 25 1630   BP: 150/97   154/86   Pulse: 115 107 115 105   Resp:       Temp:       TempSrc:       SpO2: 94% 92% 90%    Weight:       Height:           Active LDAs/IV Access:   Lines, Drains & Airways       Active LDAs       Name Placement date Placement time Site Days    Peripheral IV 25 1328 20 G Right Antecubital 25  1328  Antecubital  less than 1    Peripheral IV 25 1543 20 G Anterior;Left Forearm 25  1543  Forearm  less than 1                    Labs  (abnormal labs have a star):   Labs Reviewed   COMPREHENSIVE METABOLIC PANEL - Abnormal; Notable for the following components:       Result Value    ALT (SGPT) 38 (*)     AST (SGOT) 44 (*)     All other components within normal limits    Narrative:     GFR Categories in Chronic Kidney Disease (CKD)              GFR Category          GFR (mL/min/1.73)    Interpretation  G1                    90 or greater        Normal or high (1)  G2                    60-89                Mild decrease (1)  G3a                   45-59                Mild to moderate decrease  G3b                   30-44                Moderate to severe decrease  G4                    15-29                Severe decrease  G5                    14 or less           Kidney failure    (1)In the absence of evidence of kidney disease, neither GFR category G1 or G2 fulfill the criteria for CKD.    eGFR calculation 2021 CKD-EPI creatinine equation, which does not include race as a factor   CBC WITH AUTO DIFFERENTIAL - Abnormal; Notable for the following components:    Hemoglobin 11.5 (*)     All other components within normal limits   LACTIC ACID, PLASMA - Normal   PROCALCITONIN - Normal   C1 ESTERASE INHIBITOR, SERUM   C4 COMPLEMENT   CBC AND DIFFERENTIAL    Narrative:     The following orders were created for panel order CBC & Differential.  Procedure                               Abnormality         Status                     ---------                               -----------         ------                     CBC Auto Differential[[id number]]        Abnormal            Final result                 Please view results for these tests on the individual orders.       Meds Given in ED:   Medications   EPINEPHrine (ADRENALIN) injection 0.5 mg (0 mg Intramuscular Hold 7/22/25 1620)   dexAMETHasone (DECADRON) injection 8 mg (8 mg Intravenous Given 7/22/25 1328)   diphenhydrAMINE (BENADRYL) injection 50 mg (50 mg Intravenous Given 7/22/25 1331)   lactated  ringers bolus 500 mL (0 mL Intravenous Stopped 7/22/25 1416)   HYDROmorphone (DILAUDID) injection 0.5 mg (0.5 mg Intravenous Given 7/22/25 1333)   tranexamic acid 1000 mg in 100 mL 0.7% NaCl infusion (premix) (1,000 mg Intravenous New Bag 7/22/25 1349)   iopamidol (ISOVUE-300) 61 % injection 85 mL (75 mL Intravenous Given 7/22/25 1401)   ampicillin-sulbactam (UNASYN) 3 g in sodium chloride 0.9 % 100 mL MBP (0 g Intravenous Stopped 7/22/25 1613)   HYDROmorphone (DILAUDID) injection 0.5 mg (0.5 mg Intravenous Given 7/22/25 1542)   ketorolac (TORADOL) injection 15 mg (15 mg Intravenous Given 7/22/25 1540)   Lidocaine Viscous HCl (XYLOCAINE) 2 % solution 10 mL (10 mL Mouth/Throat Given 7/22/25 1539)           Last NIH score:                                                          Dysphagia screening results:        Alie Coma Scale:  No data recorded     CIWA:        Restraint Type:            Isolation Status:  No active isolations

## 2025-07-22 NOTE — FSED PROVIDER NOTE
Subjective  History of Present Illness:    Presents emergency department with pain on the right side of her throat.  Started at 1100 hrs. while she was working.  Has been gradually worsening.  Pain with swallowing.  No fever.  She does not take ACE inhibitor.  She has had no other swelling throughout her head and face.  She went to urgent care was told she had negative COVID and negative strep.  They recommended she come to the emergency department for further evaluation    Nurses Notes reviewed and agree, including vitals, allergies, social history and prior medical history.     REVIEW OF SYSTEMS: All systems reviewed and not pertinent unless noted.    Past Medical History:   Diagnosis Date    Cardiac murmur     GERD (gastroesophageal reflux disease)     History of blood transfusion     Malignant neoplasm of skin     Migraine     Ovarian cancer     Positive tuberculin test     Seizures        Allergies:    Adhesive tape, Azathioprine, Remicade [infliximab], and Ustekinumab (murine)      Past Surgical History:   Procedure Laterality Date    ABDOMINOPLASTY      BLADDER SURGERY      BRAIN SURGERY      BREAST EXCISIONAL BIOPSY Right     Pt states that she had a bx scar but it was around her areola where her reduction scars are now     BREAST IMPLANT SURGERY       SECTION      CHOLECYSTECTOMY      KNEE ARTHROSCOPY W/ MENISCAL REPAIR      lateral    REDUCTION MAMMAPLASTY Bilateral 2001    ROTATOR CUFF REPAIR      TOTAL ABDOMINAL HYSTERECTOMY           Social History     Socioeconomic History    Marital status:      Spouse name: Hans   Tobacco Use    Smoking status: Never    Smokeless tobacco: Never   Substance and Sexual Activity    Alcohol use: Yes     Comment: occasionally    Drug use: No    Sexual activity: Defer         Family History   Problem Relation Age of Onset    Arthritis Mother     Cancer Mother         malignant neoplasm    Hyperlipidemia Father     Hypertension Father     Heart attack Father   "   Osteoporosis Other     Thyroid disease Other     Mental illness Other     Breast cancer Maternal Aunt 30       Objective  Physical Exam:  /98   Pulse 112   Temp 98.5 °F (36.9 °C) (Oral)   Resp 20   Ht 160 cm (62.99\")   Wt 75 kg (165 lb 5.5 oz)   SpO2 95%   BMI 29.30 kg/m²      Physical Exam    Primary Survey    Airway: Patent and protected  Breathing: Symmetric bilaterally  Circulation: Mentating well, responsive        Constitutional: Appears uncomfortable  Psychological: No abnormalities of mood affect.  Head: Atraumatic  Eyes: Conjunctiva are non-injected. no scleral icterus.  ENT: No obvious congestion or obstruction noted posterior oropharynx is mildly erythematous, no glossal edema, no uvular edema, mild erythema on the right tonsil compared to left, no exudate,  Neck: No obvious deformity.  ROM appears preserved tender to palpation over the right SCM  Chest: No deformity noted.  No paradoxical breathing noted  Respiratory: Respiratory effort was normal - no use of accessory respiratory muscles noted.  There is no stridor.  Cardiovascular: Perfusion appears preserved - mentating well RRR no murmurs  Gastrointestinal: Abdomen nondistended.  Genitourinary: Not examined  Lymphatic: Not examined  Back: Not examined  Musculoskeletal: Musculoskeletal system is grossly intact.  There is no obvious deformity.  Neurological: Face: No Asymmetry.  Gross motor movement is intact in all 4 extremities.  Walks and ambulates without difficulty.  Patient exhibits normal speech.  Skin: No Pallor no obvious bruising.  No obvious rash.      ED Course:      Lab Results (last 24 hours)       ** No results found for the last 24 hours. **             No radiology results from the last 24 hrs     No orders to display       Procedures    MDM      Initial impression of presenting illness : Vital signs reviewed -nonactionable..  This is a 60-year-old with acute pharyngitis.    My initial pre-test probability for an " emergent process is moderate.  I do not see obvious peritonsillar abscess.  She may have a mass deeper in the neck that I can appreciate on exam.  She is quite tender on the right side of her neck.  This could be lymphadenitis. Does not appear to be anaphylaxis or angioedema.  Prior history shows she has past medical history of hypothyroidism, plaque psoriasis, Sjogren syndrome unspecified, she may have immunocompromise state if receiving infusions.  Obtain laboratory studies and contrasted CT of the neck to evaluate her pain    Initial treatment of presenting symptoms: Receiving dexamethasone, Benadryl, intravenous Dilaudid for symptoms     any diagnostic and therapeutic plan was ordered and interpreted by ASHU Estrada MD with emphasis on identifying and treating emergent/urgent morbid conditions likely associated with the differential diagnosis with this type of presentation.        Medications   dexAMETHasone (DECADRON) injection 8 mg (has no administration in time range)   diphenhydrAMINE (BENADRYL) injection 50 mg (has no administration in time range)   lactated ringers bolus 500 mL (has no administration in time range)   HYDROmorphone (DILAUDID) injection 0.5 mg (has no administration in time range)       HEART SCORE   No data recorded           -----  ED Disposition       None          Final diagnoses:   None     Your Follow-Up Providers    Follow-up information has not been specified.       Contact information for after-discharge care    Follow-up information has not been specified.          Your medication list        ASK your doctor about these medications        Instructions Last Dose Given Next Dose Due   ALPRAZolam 0.25 MG tablet  Commonly known as: XANAX      Take 1 tablet by mouth daily as needed.       cyclobenzaprine 10 MG tablet  Commonly known as: FLEXERIL      Take 1 tablet by mouth 2 (two) times a day as needed.       DULoxetine 60 MG capsule  Commonly known as: CYMBALTA      Take 60 mg by mouth 2  (Two) Times a Day.       Estring 2 MG ring  Generic drug: Estradiol      INSERT 1 RING INTRAVAGINALLY EVERY 3 MONTHS AS DIRECTED       fluticasone 50 MCG/ACT nasal spray  Commonly known as: FLONASE      into each nostril.       gabapentin 300 MG capsule  Commonly known as: NEURONTIN      Take 600 mg by mouth every night at bedtime.       HYDROcodone-acetaminophen 5-325 MG per tablet  Commonly known as: NORCO      Take 1 tablet by mouth Every 8 (Eight) Hours As Needed.       leflunomide 20 MG tablet  Commonly known as: ARAVA      Take 20 mg by mouth Daily.       levothyroxine 75 MCG tablet  Commonly known as: SYNTHROID, LEVOTHROID      Take 1 tablet by mouth daily. Wait 1 hour for other medication or food       omeprazole 40 MG capsule  Commonly known as: priLOSEC      Take  by mouth. Take 1 tab po qd x 2 wk then qd prn reflux       raNITIdine 150 MG tablet  Commonly known as: ZANTAC      Take 150 mg by mouth 2 (Two) Times a Day.       rizatriptan 10 MG tablet  Commonly known as: MAXALT      Take 10 mg by mouth 1 (One) Time As Needed for Migraine. May repeat in 2 hours if needed       simvastatin 20 MG tablet  Commonly known as: ZOCOR      Take 1 tablet by mouth daily.                 [address]  [place] [id number]  [phone]               Koffi Gaxiola MD Follow up on 8/7/2025.    Specialty: Otolaryngology  Follow up details: Please arrive for your appt @ 1:10 PM  [address]  [address]  [place] [id number]  [phone]               Phoebe Painting MD .    Specialty: Pediatrics  [address]  [address]  [place] [id number]  [phone]                       Contact information for after-discharge care    Follow-up information has not been specified.                    Your medication list        PAUSE taking these medications        Instructions Last Dose Given Next Dose Due   leflunomide 20 MG tablet  Wait to take this until: August 2, 2025  Commonly known as: ARAVA      Take 1 tablet by mouth Daily.       RINVOQ PO  Wait to take this until: August 2, 2025      Take  by mouth.              START taking these medications        Instructions Last Dose Given Next Dose Due   amoxicillin-clavulanate 875-125 MG per tablet  Commonly known as: AUGMENTIN      Take 1 tablet by mouth 2 (Two) Times a Day for 9 days.       predniSONE 10 MG tablet  Commonly known as: DELTASONE  Start taking on: July 23, 2025      Take 3 tablets by mouth Daily for 2 days, THEN 2 tablets Daily for 2 days, THEN 1 tablet Daily for 2 days.       saccharomyces boulardii 250 MG capsule  Commonly known as: Florastor      Take 1 capsule by mouth 2 (Two) Times a Day for 14 days.              CONTINUE taking these medications        Instructions Last Dose Given Next Dose Due   ALPRAZolam 0.25 MG tablet  Commonly known as: XANAX      Take 1 tablet by mouth Daily As Needed.       cholecalciferol 25 MCG (1000 UT) tablet  Commonly known as: VITAMIN D3      Take 2 tablets by mouth Daily.       cyclobenzaprine 10 MG tablet  Commonly known as: FLEXERIL      Take 1 tablet by mouth 2 (Two) Times a Day As Needed.       doxepin 10 MG capsule  Commonly known as: SINEquan      Take 1 capsule by mouth Every Night.        DULoxetine 60 MG capsule  Commonly known as: CYMBALTA      Take 1 capsule by mouth 2 (Two) Times a Day.       Estring 2 MG ring  Generic drug: Estradiol      INSERT 1 RING INTRAVAGINALLY EVERY 3 MONTHS AS DIRECTED       fluticasone 50 MCG/ACT nasal spray  Commonly known as: FLONASE      into each nostril.       gabapentin 300 MG capsule  Commonly known as: NEURONTIN      Take 2 capsules by mouth every night at bedtime.       levothyroxine 75 MCG tablet  Commonly known as: SYNTHROID, LEVOTHROID      Take 1 tablet by mouth Daily. Wait 1 hour for other medication or food       melatonin 5 MG tablet tablet      Take 2 tablets by mouth Every Night.       omeprazole 40 MG capsule  Commonly known as: priLOSEC      Take  by mouth. Take 1 tab po qd x 2 wk then qd prn reflux       raNITIdine 150 MG tablet  Commonly known as: ZANTAC      Take 150 mg by mouth 2 (Two) Times a Day.       rizatriptan 10 MG tablet  Commonly known as: MAXALT      Take 1 tablet by mouth 1 (One) Time As Needed for Migraine. May repeat in 2 hours if needed       rosuvastatin 5 MG tablet  Commonly known as: CRESTOR      Take 1 tablet by mouth Every Night.              STOP taking these medications      HYDROcodone-acetaminophen 5-325 MG per tablet  Commonly known as: NORCO        simvastatin 20 MG tablet  Commonly known as: ZOCOR                  Where to Get Your Medications        These medications were sent to [facility] - [place] [address]      Hours: Monday to Friday 7 AM to 5:30 PM, Saturday & Sunday 8 AM to 4:30 PM Phone: [phone]   amoxicillin-clavulanate 875-125 MG per tablet  predniSONE 10 MG tablet  saccharomyces boulardii 250 MG capsule

## 2025-07-23 VITALS
HEIGHT: 63 IN | TEMPERATURE: 98.5 F | DIASTOLIC BLOOD PRESSURE: 92 MMHG | SYSTOLIC BLOOD PRESSURE: 141 MMHG | BODY MASS INDEX: 28.62 KG/M2 | WEIGHT: 161.5 LBS | HEART RATE: 71 BPM | OXYGEN SATURATION: 93 % | RESPIRATION RATE: 16 BRPM

## 2025-07-23 LAB — C4 SERPL-MCNC: 16 MG/DL (ref 14–44)

## 2025-07-23 PROCEDURE — 25010000002 AMPICILLIN-SULBACTAM PER 1.5 G: Performed by: STUDENT IN AN ORGANIZED HEALTH CARE EDUCATION/TRAINING PROGRAM

## 2025-07-23 PROCEDURE — 99239 HOSP IP/OBS DSCHRG MGMT >30: CPT | Performed by: INTERNAL MEDICINE

## 2025-07-23 RX ORDER — PREDNISONE 10 MG/1
TABLET ORAL
Qty: 12 TABLET | Refills: 0 | Status: SHIPPED | OUTPATIENT
Start: 2025-07-23 | End: 2025-07-29

## 2025-07-23 RX ORDER — SACCHAROMYCES BOULARDII 250 MG
250 CAPSULE ORAL 2 TIMES DAILY
Qty: 28 CAPSULE | Refills: 0 | Status: SHIPPED | OUTPATIENT
Start: 2025-07-23 | End: 2025-08-06

## 2025-07-23 RX ADMIN — PANTOPRAZOLE SODIUM 40 MG: 40 TABLET, DELAYED RELEASE ORAL at 05:58

## 2025-07-23 RX ADMIN — LEVOTHYROXINE SODIUM 75 MCG: 0.07 TABLET ORAL at 05:58

## 2025-07-23 RX ADMIN — AMPICILLIN SODIUM AND SULBACTAM SODIUM 3 G: 2; 1 INJECTION, POWDER, FOR SOLUTION INTRAMUSCULAR; INTRAVENOUS at 04:39

## 2025-07-23 RX ADMIN — AMPICILLIN SODIUM AND SULBACTAM SODIUM 3 G: 2; 1 INJECTION, POWDER, FOR SOLUTION INTRAMUSCULAR; INTRAVENOUS at 09:12

## 2025-07-23 NOTE — H&P
[facility] Medicine Services  HISTORY AND PHYSICAL    Patient Name: Stacie Lao  : 1965  MRN: [id number]  Primary Care Physician: Phoebe Painting MD  Date of admission: 2025      Subjective   Subjective     Chief Complaint:  Facial swelling    HPI:  Stacie Lao is a 60 y.o. 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  She initially difficulty swallowing when she woke up and swelling of right side of face.  No fevers or chills or trouble breathing.  Feels much better now than when she came in.  Initially had very painful throat.  No recent new medications.  No recent sick contacts.  She was found to have a peritonsillar abscess, ENT was consulted, and patient was transferred here for further management.      Personal History     Past Medical History:   Diagnosis Date    Brain tumor     removed    Cardiac murmur     GERD (gastroesophageal reflux disease)     History of blood transfusion     Malignant neoplasm of skin     Migraine     Ovarian cancer     Positive tuberculin test     Seizures            Past Surgical History:   Procedure Laterality Date    ABDOMINOPLASTY      BLADDER SURGERY      BRAIN SURGERY      BREAST EXCISIONAL BIOPSY Right     Pt states that she had a bx scar but it was around her areola where her reduction scars are now     BREAST IMPLANT SURGERY       SECTION      CHOLECYSTECTOMY      KNEE ARTHROSCOPY W/ MENISCAL REPAIR      lateral    REDUCTION MAMMAPLASTY Bilateral     ROTATOR CUFF REPAIR      TOTAL ABDOMINAL HYSTERECTOMY         Family History: family history includes Arthritis in her mother; Breast cancer (age of onset: 30) in her maternal aunt; Cancer in her mother; Heart attack in her father; Hyperlipidemia in her father; Hypertension in her father; Mental illness in an other family member; Osteoporosis in an other family member; Thyroid disease in an  other family member.     Social History:  reports that she has never smoked. She has never used smokeless tobacco. She reports current alcohol use. She reports that she does not use drugs.  Social History     Social History Narrative    Not on file       Medications:  Available home medication information reviewed.  ALPRAZolam, DULoxetine, Estradiol, HYDROcodone-acetaminophen, Upadacitinib, cholecalciferol, cyclobenzaprine, doxepin, fluticasone, gabapentin, leflunomide, levothyroxine, melatonin, omeprazole, raNITIdine, rizatriptan, rosuvastatin, and simvastatin    Allergies   Allergen Reactions    Adhesive Tape      Surgical adhesives    Azathioprine     Remicade [Infliximab]     Ustekinumab (Murine)        Objective   Objective     Vital Signs:   Temp:  [97.8 °F (36.6 °C)-98.5 °F (36.9 °C)] 97.8 °F (36.6 °C)  Heart Rate:  [] 83  Resp:  [16-20] 16  BP: (135-178)/() 169/99  Flow (L/min) (Oxygen Therapy):  [2] 2       Physical Exam   Awake alert oriented x 3  Resting comfortably in bed  Speaking full sentences  Work of breathing normal  Mild swelling of right submandible and pharyngeal erythema  Heart regular rate and rhythm  Lungs clear to auscultation bilaterally  Abdomen soft, nontender  No peripheral edema  No rash    Result Review:  I have personally reviewed the results from the time of this admission to 7/22/2025 22:05 EDT and agree with these findings:  [x]  Laboratory list / accordion  []  Microbiology  [x]  Radiology  [x]  EKG/Telemetry   []  Cardiology/Vascular   []  Pathology  []  Old records  []  Other:  Most notable findings include: See assessment plan      LAB RESULTS:      Lab 07/22/25  1327   WBC 6.20   HEMOGLOBIN 11.5*   HEMATOCRIT 35.1   PLATELETS 211   NEUTROS ABS 3.98   IMMATURE GRANS (ABS) 0.00   LYMPHS ABS 1.69   MONOS ABS 0.48   EOS ABS 0.02   MCV 88.2   PROCALCITONIN 0.02   LACTATE 0.7         Lab 07/22/25  1327   SODIUM 144   POTASSIUM 4.0   CHLORIDE 106   CO2 24.0   ANION GAP  14.0   BUN 19.2   CREATININE 0.90   EGFR 73.3   GLUCOSE 93   CALCIUM 9.9         Lab 07/22/25  1327   TOTAL PROTEIN 6.6   ALBUMIN 4.6   GLOBULIN 2.0   ALT (SGPT) 38*   AST (SGOT) 44*   BILIRUBIN 0.6   ALK PHOS 69                         Microbiology Results (last 10 days)       ** No results found for the last 240 hours. **            CT Soft Tissue Neck With Contrast  Result Date: 7/22/2025  CT SOFT TISSUE NECK W CONTRAST Date of Exam: 7/22/2025 1:56 PM EDT Indication: neck pain. Comparison: None available. Technique: Axial CT images were obtained of the neck after the uneventful intravenous administration of 75 mL Isovue-300.  Reconstructed coronal and sagittal images were also obtained. Automated exposure control and iterative construction methods were used. Findings: History from emergency room indicates right-sided throat pain, gradually worsening since this morning, pain with swallowing. No fever. Negative COVID test. Axial images 34 through 31 show relatively mild but asymmetric enlargement of the right palatine tonsil, mild generalized palatine enlargement elsewhere, and what appears to be an elongated right tonsillar crypt abscess, perhaps best seen on sagittal images 28 through 24. The presumed abscess is elongated, perhaps up to 2 cm in length, but only 0.4 cm in maximal diameter. There is relatively mild associated airway narrowing, image 28/2. No other potential abscess is seen elsewhere. Remainder of the scan shows normal-appearing adenoids, and normal-appearing prevertebral soft tissues. Epiglottis appears normal. No supraglottic or subglottic airway narrowing is seen. No airway foreign body is identified. No significant cervical lymphadenopathy or lymphadenopathy of the included mediastinum is seen. Parotid glands and submandibular glands appear normal. Thyroid appears atrophic consistent with history of hypothyroidism. Included paranasal sinuses and mastoids appear normally aerated and clear. There  is advanced multilevel lower cervical and upper thoracic spine degenerative disc disease, and grade 1 anterior subluxation of C4 on C5, likely as a result of degenerative disc disease at the more inferior disc levels. Images of the base of the brain show previous left temporal craniotomy, and what appears to be a densely calcified mass of the inner table of the middle cranial fossa, whether densely calcified meningioma or inner table osteoma. Low signal in this location on the 2013 brain MRI suggests this is a stable, chronic finding. No other significant skull base irregularities are seen. No acute bony abnormalities are appreciated.     Impression: Impression: 1. Enlarged right palatine tonsil, and associated, elongated, approximately 2 X 0.4 cm tonsillar crypt abscess. Mild generalized enlargement of the tonsils elsewhere. 2. No evidence of other potential acute inflammatory focus or other significant disease in the neck soft tissues. 3. Atrophic thyroid consistent with history of hypothyroidism. Previous left temporal craniotomy, and apparently stable densely calcified left middle cranial fossa meningioma or osteoma since 2013. Incidentally noted advanced multilevel lower cervical and upper thoracic spine degenerative disc disease Electronically Signed: Glen Londono MD  7/22/2025 2:32 PM EDT  Workstation ID: [id number]          Assessment & Plan   Assessment & Plan       Peritonsillar abscess    Hypothyroidism    Immunocompromised state    Arthropathic psoriasis, unspecified    Pharyngitis and peritonsillar abscess  - Patient currently comfortable with no respiratory concerns at this time.  Enlarged right palatine tonsil, and associated, elongated, approximately 2 X 0.4 cm tonsillar crypt abscess  on CT. ENT was consulted and will see in consult.  Will continue Unasyn, oral lidocaine, check strep a screen    Psoriasis  -Will hold immunosuppressive medications at this time secondary to the  above    Hypothyroidism  -synthroid      VTE Prophylaxis:  Mechanical VTE prophylaxis orders are present.          CODE STATUS:    Code Status and Medical Interventions: CPR (Attempt to Resuscitate); Full Support   Ordered at: 07/22/25 4322     Code Status (Patient has no pulse and is not breathing):    CPR (Attempt to Resuscitate)     Medical Interventions (Patient has pulse or is breathing):    Full Support       Expected Discharge   Expected discharge date/ time has not been documented.     Naga Velazquez MD  07/22/25

## 2025-07-23 NOTE — PLAN OF CARE
Problem: Adult Inpatient Plan of Care  Goal: Plan of Care Review  Outcome: Met  Goal: Patient-Specific Goal (Individualized)  Outcome: Met  Goal: Absence of Hospital-Acquired Illness or Injury  Outcome: Met  Intervention: Identify and Manage Fall Risk  Recent Flowsheet Documentation  Taken 7/23/2025 1400 by Allyson Livingston RN  Safety Promotion/Fall Prevention:   activity supervised   safety round/check completed  Taken 7/23/2025 1200 by Allyson Livingston RN  Safety Promotion/Fall Prevention:   activity supervised   safety round/check completed  Taken 7/23/2025 1000 by Allyson Livingston RN  Safety Promotion/Fall Prevention:   activity supervised   safety round/check completed  Taken 7/23/2025 0800 by Allyson Livingston RN  Safety Promotion/Fall Prevention:   activity supervised   safety round/check completed  Intervention: Prevent Skin Injury  Recent Flowsheet Documentation  Taken 7/23/2025 1400 by Allyson Livingston RN  Body Position: position changed independently  Skin Protection: transparent dressing maintained  Taken 7/23/2025 1200 by Allyson Livingston RN  Body Position: position changed independently  Skin Protection: transparent dressing maintained  Taken 7/23/2025 1000 by Allyson Livingston RN  Body Position: position changed independently  Skin Protection: transparent dressing maintained  Taken 7/23/2025 0800 by Allyson Livingston RN  Body Position: position changed independently  Skin Protection: incontinence pads utilized  Goal: Optimal Comfort and Wellbeing  Outcome: Met  Goal: Readiness for Transition of Care  Outcome: Met  Goal: Plan of Care Review  Outcome: Met  Goal: Patient-Specific Goal (Individualized)  Outcome: Met  Goal: Absence of Hospital-Acquired Illness or Injury  Outcome: Met  Intervention: Identify and Manage Fall Risk  Recent Flowsheet Documentation  Taken 7/23/2025 1400 by Allyson Livingston RN  Safety Promotion/Fall Prevention:   activity supervised   safety round/check completed  Taken 7/23/2025 1200 by Miki  BRIAN Valadez  Safety Promotion/Fall Prevention:   activity supervised   safety round/check completed  Taken 7/23/2025 1000 by Allyson Livingston RN  Safety Promotion/Fall Prevention:   activity supervised   safety round/check completed  Taken 7/23/2025 0800 by Allyson Livingston RN  Safety Promotion/Fall Prevention:   activity supervised   safety round/check completed  Intervention: Prevent Skin Injury  Recent Flowsheet Documentation  Taken 7/23/2025 1400 by Allyson Livingston RN  Body Position: position changed independently  Skin Protection: transparent dressing maintained  Taken 7/23/2025 1200 by Allyson Livingston RN  Body Position: position changed independently  Skin Protection: transparent dressing maintained  Taken 7/23/2025 1000 by Allyson Livingston RN  Body Position: position changed independently  Skin Protection: transparent dressing maintained  Taken 7/23/2025 0800 by Allyson Livingston RN  Body Position: position changed independently  Skin Protection: incontinence pads utilized  Goal: Optimal Comfort and Wellbeing  Outcome: Met  Goal: Readiness for Transition of Care  Outcome: Met     Problem: Infection  Goal: Absence of Infection Signs and Symptoms  Outcome: Met     Problem: Pain Acute  Goal: Optimal Pain Control and Function  Outcome: Met   Goal Outcome Evaluation:         Discharge education complete. VSS. RA. Refusing wheelchair and ambulating with family to Meds to Beds. Care plan complete.

## 2025-07-23 NOTE — PROGRESS NOTES
[facility] Medicine Services  PROGRESS NOTE    Patient Name: Stacie Lao  : 1965  MRN: [id number]    Date of Admission: 2025  Primary Care Physician: Phoebe Painting MD    Subjective   Subjective     CC:  F/U peritonsillar abscess    HPI:  She is feeling much better today.  Still some soreness in her throat and right side of neck but overall improved from presentation.  No difficulty swallowing or breathing.      Objective   Objective     Vital Signs:   Temp:  [97.8 °F (36.6 °C)-98.5 °F (36.9 °C)] 97.9 °F (36.6 °C)  Heart Rate:  [] 71  Resp:  [16-20] 16  BP: (121-178)/() 121/97  Flow (L/min) (Oxygen Therapy):  [2] 2     Physical Exam:  Constitutional: No acute distress, awake, alert, sitting up in chair  HENT: NCAT, mucous membranes moist, erythema and swelling of right posterior oropharynx  Respiratory: Respiratory effort normal on room air  Cardiovascular: RRR  Musculoskeletal: No bilateral ankle edema  Psychiatric: Appropriate affect, cooperative  Neurologic: Speech clear and fluent  Skin: No rashes on exposed surfaces    Results Reviewed:  LAB RESULTS:      Lab 25  1327   WBC 6.20   HEMOGLOBIN 11.5*   HEMATOCRIT 35.1   PLATELETS 211   NEUTROS ABS 3.98   IMMATURE GRANS (ABS) 0.00   LYMPHS ABS 1.69   MONOS ABS 0.48   EOS ABS 0.02   MCV 88.2   PROCALCITONIN 0.02   LACTATE 0.7         Lab 25  1327   SODIUM 144   POTASSIUM 4.0   CHLORIDE 106   CO2 24.0   ANION GAP 14.0   BUN 19.2   CREATININE 0.90   EGFR 73.3   GLUCOSE 93   CALCIUM 9.9         Lab 25  1327   TOTAL PROTEIN 6.6   ALBUMIN 4.6   GLOBULIN 2.0   ALT (SGPT) 38*   AST (SGOT) 44*   BILIRUBIN 0.6   ALK PHOS 69                     Brief Urine Lab Results       None            Microbiology Results Abnormal       None            CT Soft Tissue Neck With Contrast  Result Date: 2025  CT SOFT TISSUE NECK W CONTRAST Date of Exam: 2025 1:56 PM EDT Indication: neck pain.  Comparison: None available. Technique: Axial CT images were obtained of the neck after the uneventful intravenous administration of 75 mL Isovue-300.  Reconstructed coronal and sagittal images were also obtained. Automated exposure control and iterative construction methods were used. Findings: History from emergency room indicates right-sided throat pain, gradually worsening since this morning, pain with swallowing. No fever. Negative COVID test. Axial images 34 through 31 show relatively mild but asymmetric enlargement of the right palatine tonsil, mild generalized palatine enlargement elsewhere, and what appears to be an elongated right tonsillar crypt abscess, perhaps best seen on sagittal images 28 through 24. The presumed abscess is elongated, perhaps up to 2 cm in length, but only 0.4 cm in maximal diameter. There is relatively mild associated airway narrowing, image 28/2. No other potential abscess is seen elsewhere. Remainder of the scan shows normal-appearing adenoids, and normal-appearing prevertebral soft tissues. Epiglottis appears normal. No supraglottic or subglottic airway narrowing is seen. No airway foreign body is identified. No significant cervical lymphadenopathy or lymphadenopathy of the included mediastinum is seen. Parotid glands and submandibular glands appear normal. Thyroid appears atrophic consistent with history of hypothyroidism. Included paranasal sinuses and mastoids appear normally aerated and clear. There is advanced multilevel lower cervical and upper thoracic spine degenerative disc disease, and grade 1 anterior subluxation of C4 on C5, likely as a result of degenerative disc disease at the more inferior disc levels. Images of the base of the brain show previous left temporal craniotomy, and what appears to be a densely calcified mass of the inner table of the middle cranial fossa, whether densely calcified meningioma or inner table osteoma. Low signal in this location on the 2013  brain MRI suggests this is a stable, chronic finding. No other significant skull base irregularities are seen. No acute bony abnormalities are appreciated.     Impression: Impression: 1. Enlarged right palatine tonsil, and associated, elongated, approximately 2 X 0.4 cm tonsillar crypt abscess. Mild generalized enlargement of the tonsils elsewhere. 2. No evidence of other potential acute inflammatory focus or other significant disease in the neck soft tissues. 3. Atrophic thyroid consistent with history of hypothyroidism. Previous left temporal craniotomy, and apparently stable densely calcified left middle cranial fossa meningioma or osteoma since 2013. Incidentally noted advanced multilevel lower cervical and upper thoracic spine degenerative disc disease Electronically Signed: Glen Londono MD  7/22/2025 2:32 PM EDT  Workstation ID: [id number]          Current medications:  Scheduled Meds:ampicillin-sulbactam, 3 g, Intravenous, Q6H  [Held by provider] doxepin, 10 mg, Oral, Nightly  [Held by provider] DULoxetine, 60 mg, Oral, BID  gabapentin, 600 mg, Oral, Nightly  [Held by provider] leflunomide, 20 mg, Oral, Daily  levothyroxine, 75 mcg, Oral, Q AM  melatonin, 10 mg, Oral, Nightly  pantoprazole, 40 mg, Oral, Q AM  [Held by provider] rosuvastatin, 5 mg, Oral, Nightly  sodium chloride, 10 mL, Intravenous, Q12H      Continuous Infusions:lactated ringers, 100 mL/hr, Last Rate: 100 mL/hr (07/23/25 7706)      PRN Meds:.  acetaminophen **OR** acetaminophen **OR** acetaminophen    senna-docusate sodium **AND** polyethylene glycol **AND** bisacodyl **AND** bisacodyl    Calcium Replacement - Follow Nurse / BPA Driven Protocol    fluticasone    ipratropium-albuterol    Lidocaine Viscous HCl    Magnesium Standard Dose Replacement - Follow Nurse / BPA Driven Protocol    Morphine **AND** naloxone    Phosphorus Replacement - Follow Nurse / BPA Driven Protocol    Potassium Replacement - Follow Nurse / BPA Driven Protocol    sodium  chloride    sodium chloride    Assessment & Plan   Assessment & Plan     Active Hospital Problems    Diagnosis  POA    **Peritonsillar abscess [J36]  Yes    Arthropathic psoriasis, unspecified [L40.50]  Yes    Immunocompromised state [D84.9]  Yes    Hypothyroidism [E03.9]  Yes      Resolved Hospital Problems   No resolved problems to display.        Brief Hospital Course to date:  Stacie Lao is a 60 y.o. 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    This patient's problems and plans were partially entered by my partner and updated as appropriate by me 07/23/25.     Pharyngitis and peritonsillar abscess  - Enlarged right palatine tonsil, and associated, elongated, approximately 2 X 0.4 cm tonsillar crypt abscess  on CT.   - ENT consult  - Continue Unasyn, oral lidocaine, check strep a screen     Psoriasis  -Will hold immunosuppressive medications at this time secondary to the above     Hypothyroidism  -Continue synthroid    Mildly elevated LFTs  -Will need outpatient follow up with PCP for monitoring    She has been working with her PCP to workup potential endocrine type symptoms (mainly temperature regulation) and is inquiring about pituitary as a potential source.  When questioned, she says she has occasional changes in her visual acuity but this is intermittent.  She is inquiring about a referral to neurosurgery given her history.  Calcified mass on CT noted at area of prior craniotomy which appears to be a stable finding from remote MRIs.  Given stability on imaging, suspect this finding is not related to her current symptoms.  I think would be most appropriate to continue outpatient workup with PCP regarding her endocrine concerns and would consider an MRI brain (pituitary protocol if endocrine concerns/visual changes) outpatient which could also confirm stability of the chronic lesion and then consider referral  to neurosurgery if indicated based on results.    Expected Discharge Location and Transportation: Home  Expected Discharge   Expected Discharge Date: 7/24/2025; Expected Discharge Time:      VTE Prophylaxis:  Mechanical VTE prophylaxis orders are present.         AM-PAC 6 Clicks Score (PT): 24 (07/22/25 2300)    CODE STATUS:   Code Status and Medical Interventions: CPR (Attempt to Resuscitate); Full Support   Ordered at: 07/22/25 2204     Code Status (Patient has no pulse and is not breathing):    CPR (Attempt to Resuscitate)     Medical Interventions (Patient has pulse or is breathing):    Full Support       Ene Crystal MD  07/23/25

## 2025-07-23 NOTE — CONSULTS
ENT CONSULT    Stacie Lao  [id number]  1965  Date of Consult 2025       Referring Provider: Dr. Naga Velazquez      Reason for Consultation: Acute tonsillitis w/ right peritonsillar abscess    History of present illness:      Stacie Lao is a pleasant 60 year old female being seen in consultation of acute tonsillitis with suspicious right peritonsillar abscess. She says yesterday morning she began to feel a fullness in the right throat. Through the day she would start to develop soreness and change in voice. Then by yesterday evening there was significant throat pain, trouble swallowing, and trouble breathing.    Mrs. Lao presented to the ED yesterday evening and admitted today for monitoring. She says she is feeling much better today. She is not having trouble swallowing anymore and denies issues with breathing. She is able to take in normal food. Her pain has improved to where there is just soreness of the right throat and tender to touch the neck. Her vitals have been stable and WBC count normal.       Review of Systems  ENT ROS: negative  Pertinent items are noted in HPI  History  Past Medical History:   Diagnosis Date    Brain tumor 2013    removed    Cardiac murmur     GERD (gastroesophageal reflux disease)     History of blood transfusion     Malignant neoplasm of skin     Migraine     Ovarian cancer     Positive tuberculin test     Seizures    ,   Past Surgical History:   Procedure Laterality Date    ABDOMINOPLASTY      BLADDER SURGERY      BRAIN SURGERY      BREAST EXCISIONAL BIOPSY Right     Pt states that she had a bx scar but it was around her areola where her reduction scars are now     BREAST IMPLANT SURGERY       SECTION      CHOLECYSTECTOMY      KNEE ARTHROSCOPY W/ MENISCAL REPAIR      lateral    REDUCTION MAMMAPLASTY Bilateral     ROTATOR CUFF REPAIR      TOTAL ABDOMINAL HYSTERECTOMY     ,   Family History   Problem Relation Age of Onset    Arthritis Mother     Cancer  Mother         malignant neoplasm    Hyperlipidemia Father     Hypertension Father     Heart attack Father     Osteoporosis Other     Thyroid disease Other     Mental illness Other     Breast cancer Maternal Aunt 30   ,   Social History     Tobacco Use    Smoking status: Never    Smokeless tobacco: Never   Vaping Use    Vaping status: Never Used   Substance Use Topics    Alcohol use: Yes     Comment: occasionally    Drug use: No   ,   Medications Prior to Admission   Medication Sig Dispense Refill Last Dose/Taking    Cholecalciferol 25 MCG (1000 UT) tablet Take 2 tablets by mouth Daily.   7/21/2025 Morning    cyclobenzaprine (FLEXERIL) 10 MG tablet Take 1 tablet by mouth 2 (Two) Times a Day As Needed.   7/21/2025    doxepin (SINEquan) 10 MG capsule Take 1 capsule by mouth Every Night.   7/21/2025 Evening    DULoxetine (CYMBALTA) 60 MG capsule Take 1 capsule by mouth 2 (Two) Times a Day.   7/21/2025    fluticasone (FLONASE) 50 MCG/ACT nasal spray into each nostril. (Patient taking differently: Administer  into the nostril(s) as directed by provider As Needed.)   Patient Taking Differently    gabapentin (NEURONTIN) 300 MG capsule Take 2 capsules by mouth every night at bedtime.   7/21/2025    leflunomide (ARAVA) 20 MG tablet Take 1 tablet by mouth Daily.   7/21/2025    levothyroxine (SYNTHROID, LEVOTHROID) 75 MCG tablet Take 1 tablet by mouth Daily. Wait 1 hour for other medication or food   7/21/2025    omeprazole (PriLOSEC) 40 MG capsule Take  by mouth. Take 1 tab po qd x 2 wk then qd prn reflux (Patient taking differently: Take  by mouth As Needed. Take 1 tab po qd x 2 wk then qd prn reflux)   Patient Taking Differently    raNITIdine (ZANTAC) 150 MG tablet Take 150 mg by mouth 2 (Two) Times a Day. (Patient taking differently: Take 1 tablet by mouth As Needed.)   Patient Taking Differently    rosuvastatin (CRESTOR) 5 MG tablet Take 1 tablet by mouth Every Night.   7/21/2025 Evening    Upadacitinib (RINVOQ PO) Take   "by mouth.   7/21/2025 Evening    ALPRAZolam (XANAX) 0.25 MG tablet Take 1 tablet by mouth Daily As Needed.       ESTRING 2 MG vaginal ring INSERT 1 RING INTRAVAGINALLY EVERY 3 MONTHS AS DIRECTED  0     HYDROcodone-acetaminophen (NORCO) 5-325 MG per tablet Take 1 tablet by mouth Every 8 (Eight) Hours As Needed. 5 tablet 0     melatonin 5 MG tablet tablet Take 2 tablets by mouth Every Night.       rizatriptan (MAXALT) 10 MG tablet Take 1 tablet by mouth 1 (One) Time As Needed for Migraine. May repeat in 2 hours if needed       simvastatin (ZOCOR) 20 MG tablet Take 1 tablet by mouth Daily.       and Allergies:  Adhesive tape, Azathioprine, Remicade [infliximab], and Ustekinumab (murine)    Objective     Vital Signs   /92 (BP Location: Left arm, Patient Position: Sitting)   Pulse 71   Temp 98.5 °F (36.9 °C) (Oral)   Resp 16   Ht 160 cm (62.99\")   Wt 73.3 kg (161 lb 8 oz)   SpO2 93%   BMI 28.62 kg/m²     Physical Exam:     General Appearance:    Alert, cooperative, in no acute distress   Head:    Normocephalic, without obvious abnormality, atraumatic       Throat:   No oral lesions, no thrush, oral mucosa moist; mild edema and erythema of right tonsil with cyst of the right superior lobe and local mild cellulitis   Neck:   Mild edema of bilateral-anterior nodes, supple, trachea midline, no thyromegaly, no     carotid bruit, no JVD   Lungs:     Clear to auscultation,respirations regular, even and                   unlabored    Heart:    Regular rhythm and normal rate, normal S1 and S2, no            murmur, no gallop, no rub, no click       Neurologic:   Cranial nerves 2 - 12 grossly intact, sensation intact             Results Review:   I reviewed the patient's new clinical results.  Results from last 7 days   Lab Units 07/22/25  1327   WBC 10*3/mm3 6.20   HEMOGLOBIN g/dL 11.5*   HEMATOCRIT % 35.1   PLATELETS 10*3/mm3 211     Results from last 7 days   Lab Units 07/22/25  1327   SODIUM mmol/L 144   POTASSIUM " mmol/L 4.0   CHLORIDE mmol/L 106   CO2 mmol/L 24.0   BUN mg/dL 19.2   CREATININE mg/dL 0.90   CALCIUM mg/dL 9.9   BILIRUBIN mg/dL 0.6   ALK PHOS U/L 69   ALT (SGPT) U/L 38*   AST (SGOT) U/L 44*   GLUCOSE mg/dL 93       Imaging:  Imaging Results (Last 72 Hours)       Procedure Component Value Units Date/Time    CT Soft Tissue Neck With Contrast [[id number]] Collected: 07/22/25 1416     Updated: 07/22/25 1435    Narrative:        CT SOFT TISSUE NECK W CONTRAST    Date of Exam: 7/22/2025 1:56 PM EDT    Indication: neck pain.    Comparison: None available.    Technique: Axial CT images were obtained of the neck after the uneventful intravenous administration of 75 mL Isovue-300.  Reconstructed coronal and sagittal images were also obtained. Automated exposure control and iterative construction methods were   used.      Findings:  History from emergency room indicates right-sided throat pain, gradually worsening since this morning, pain with swallowing. No fever. Negative COVID test.    Axial images 34 through 31 show relatively mild but asymmetric enlargement of the right palatine tonsil, mild generalized palatine enlargement elsewhere, and what appears to be an elongated right tonsillar crypt abscess, perhaps best seen on sagittal   images 28 through 24. The presumed abscess is elongated, perhaps up to 2 cm in length, but only 0.4 cm in maximal diameter. There is relatively mild associated airway narrowing, image 28/2. No other potential abscess is seen elsewhere.    Remainder of the scan shows normal-appearing adenoids, and normal-appearing prevertebral soft tissues. Epiglottis appears normal. No supraglottic or subglottic airway narrowing is seen. No airway foreign body is identified. No significant cervical   lymphadenopathy or lymphadenopathy of the included mediastinum is seen.    Parotid glands and submandibular glands appear normal. Thyroid appears atrophic consistent with history of hypothyroidism. Included  paranasal sinuses and mastoids appear normally aerated and clear. There is advanced multilevel lower cervical and upper   thoracic spine degenerative disc disease, and grade 1 anterior subluxation of C4 on C5, likely as a result of degenerative disc disease at the more inferior disc levels.     Images of the base of the brain show previous left temporal craniotomy, and what appears to be a densely calcified mass of the inner table of the middle cranial fossa, whether densely calcified meningioma or inner table osteoma. Low signal in this   location on the 2013 brain MRI suggests this is a stable, chronic finding. No other significant skull base irregularities are seen. No acute bony abnormalities are appreciated.      Impression:      Impression:    1. Enlarged right palatine tonsil, and associated, elongated, approximately 2 X 0.4 cm tonsillar crypt abscess. Mild generalized enlargement of the tonsils elsewhere.    2. No evidence of other potential acute inflammatory focus or other significant disease in the neck soft tissues.    3. Atrophic thyroid consistent with history of hypothyroidism. Previous left temporal craniotomy, and apparently stable densely calcified left middle cranial fossa meningioma or osteoma since 2013. Incidentally noted advanced multilevel lower cervical   and upper thoracic spine degenerative disc disease        Electronically Signed: Glen Londono MD    7/22/2025 2:32 PM EDT    Workstation ID: [id number]                  Assessment/Plan:    Acute Tonsillitis w/ right tonsilar cyst and peritonsillar cellulitis       - Discharge home on current medications and POC:   - Rx: Augmentin 875mg PO, 1 tablet BID for 10 days   - Rx: Prednisone 10mg PO, 3 tablets QD on days 1-2, then 2 tablets QD on days 3-4, and    1 tablet QD on days 5-6   - Rx: Viscous Lidocaine BID for 3 days    - F/U with myself or Dr. Jesse Soto in clinic in 10 days         I discussed the patients findings and my  recommendations with patient and family    Chandan Klein, APRN  07/23/25  12:56 EDT    Time: More than 50% of time spent in counseling and coordination of care:  Total face-to-face/floor time 20 min.  Time spent in counseling 10 min. Counseling included the following topics: examination findings, POC, plans to discharge home, and home treatment plan

## 2025-07-23 NOTE — DISCHARGE SUMMARY
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          Hospital Course:  Stacie Lao is a 60 y.o. 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     This patient's problems and plans were partially entered by my partner and updated as appropriate by me 25.      Pharyngitis and peritonsillar abscess  - Enlarged right palatine tonsil, and associated, elongated, concern for approximately 2 X 0.4 cm tonsillar crypt abscess on CT.   - Improved with IV unasyn and steroids  - Seen by ENT.  Recommend conservative management with ongoing antibiotics and steroids.  D/C home on augmentin 875mg BID to complete 10 days and short prednisone taper.  Follow up with [facility] in about 10 days.     Psoriasis  -Will hold immunosuppressive medications until completion of antibiotics     Hypothyroidism  -Continue synthroid     Mildly elevated LFTs  -Will need outpatient follow up with PCP for recheck/monitoring     She has been working with her PCP to workup potential endocrine type symptoms (mainly temperature regulation) and is inquiring about pituitary as a potential source.  When questioned, she says she has occasional changes in her visual acuity but this is  intermittent.  She is inquiring about a referral to neurosurgery given her history.  Calcified mass on CT noted at area of prior craniotomy which appears to be a stable finding from remote MRIs.  Given stability on imaging, suspect this finding is not related to her current symptoms.  I think would be most appropriate to continue outpatient workup with PCP regarding her endocrine concerns and would consider an MRI brain (pituitary protocol if endocrine concerns/visual changes) outpatient which could also confirm stability of the chronic lesion and then consider referral to neurosurgery if indicated based on results.         Discharge Follow Up Recommendations for outpatient labs/diagnostics:  F/U with PCP in 1 week for above  F/U ENT in ~10 days    Day of Discharge     HPI:   She is feeling much better today. Still some soreness in her throat and right side of neck but overall improved from presentation. No difficulty swallowing or breathing.     Vital Signs:   Temp:  [97.8 °F (36.6 °C)-98.5 °F (36.9 °C)] 98.5 °F (36.9 °C)  Heart Rate:  [] 71  Resp:  [16-18] 16  BP: (119-178)/() 141/92  Flow (L/min) (Oxygen Therapy):  [2] 2      Physical Exam:  Constitutional: No acute distress, awake, alert, sitting up in chair  HENT: NCAT, mucous membranes moist, erythema and swelling of right posterior oropharynx  Respiratory: Respiratory effort normal on room air  Cardiovascular: RRR  Musculoskeletal: No bilateral ankle edema  Psychiatric: Appropriate affect, cooperative  Neurologic: Speech clear and fluent  Skin: No rashes on exposed surfaces    Pertinent  and/or Most Recent Results     LAB RESULTS:      Lab 07/22/25  1327   WBC 6.20   HEMOGLOBIN 11.5*   HEMATOCRIT 35.1   PLATELETS 211   NEUTROS ABS 3.98   IMMATURE GRANS (ABS) 0.00   LYMPHS ABS 1.69   MONOS ABS 0.48   EOS ABS 0.02   MCV 88.2   PROCALCITONIN 0.02   LACTATE 0.7         Lab 07/22/25  1327   SODIUM 144   POTASSIUM 4.0   CHLORIDE 106   CO2 24.0   ANION  GAP 14.0   BUN 19.2   CREATININE 0.90   EGFR 73.3   GLUCOSE 93   CALCIUM 9.9         Lab 07/22/25  1327   TOTAL PROTEIN 6.6   ALBUMIN 4.6   GLOBULIN 2.0   ALT (SGPT) 38*   AST (SGOT) 44*   BILIRUBIN 0.6   ALK PHOS 69                     Brief Urine Lab Results       None          Microbiology Results (last 10 days)       ** No results found for the last 240 hours. **            CT Soft Tissue Neck With Contrast  Result Date: 7/22/2025  CT SOFT TISSUE NECK W CONTRAST Date of Exam: 7/22/2025 1:56 PM EDT Indication: neck pain. Comparison: None available. Technique: Axial CT images were obtained of the neck after the uneventful intravenous administration of 75 mL Isovue-300.  Reconstructed coronal and sagittal images were also obtained. Automated exposure control and iterative construction methods were used. Findings: History from emergency room indicates right-sided throat pain, gradually worsening since this morning, pain with swallowing. No fever. Negative COVID test. Axial images 34 through 31 show relatively mild but asymmetric enlargement of the right palatine tonsil, mild generalized palatine enlargement elsewhere, and what appears to be an elongated right tonsillar crypt abscess, perhaps best seen on sagittal images 28 through 24. The presumed abscess is elongated, perhaps up to 2 cm in length, but only 0.4 cm in maximal diameter. There is relatively mild associated airway narrowing, image 28/2. No other potential abscess is seen elsewhere. Remainder of the scan shows normal-appearing adenoids, and normal-appearing prevertebral soft tissues. Epiglottis appears normal. No supraglottic or subglottic airway narrowing is seen. No airway foreign body is identified. No significant cervical lymphadenopathy or lymphadenopathy of the included mediastinum is seen. Parotid glands and submandibular glands appear normal. Thyroid appears atrophic consistent with history of hypothyroidism. Included paranasal sinuses and  mastoids appear normally aerated and clear. There is advanced multilevel lower cervical and upper thoracic spine degenerative disc disease, and grade 1 anterior subluxation of C4 on C5, likely as a result of degenerative disc disease at the more inferior disc levels. Images of the base of the brain show previous left temporal craniotomy, and what appears to be a densely calcified mass of the inner table of the middle cranial fossa, whether densely calcified meningioma or inner table osteoma. Low signal in this location on the 2013 brain MRI suggests this is a stable, chronic finding. No other significant skull base irregularities are seen. No acute bony abnormalities are appreciated.     Impression: 1. Enlarged right palatine tonsil, and associated, elongated, approximately 2 X 0.4 cm tonsillar crypt abscess. Mild generalized enlargement of the tonsils elsewhere. 2. No evidence of other potential acute inflammatory focus or other significant disease in the neck soft tissues. 3. Atrophic thyroid consistent with history of hypothyroidism. Previous left temporal craniotomy, and apparently stable densely calcified left middle cranial fossa meningioma or osteoma since 2013. Incidentally noted advanced multilevel lower cervical and upper thoracic spine degenerative disc disease Electronically Signed: Glen Londono MD  7/22/2025 2:32 PM EDT  Workstation ID: [id number]                  Plan for Follow-up of Pending Labs/Results: Do not think results will  as current symptoms appear to be most likely infection related but can consider referral to immunology if abnormal.  Pending Labs       Order Current Status    C1 Esterase Inhibitor, Serum In process          Discharge Details        Discharge Medications        PAUSE taking these medications        Instructions Start Date   leflunomide 20 MG tablet  Wait to take this until: August 2, 2025  Commonly known as: ARAVA   20 mg, Daily      RINVOQ PO  Wait to take  this until: August 2, 2025   Take  by mouth.             New Medications        Instructions Start Date   amoxicillin-clavulanate 875-125 MG per tablet  Commonly known as: AUGMENTIN   1 tablet, Oral, 2 Times Daily      predniSONE 10 MG tablet  Commonly known as: DELTASONE   Take 3 tablets by mouth Daily for 2 days, THEN 2 tablets Daily for 2 days, THEN 1 tablet Daily for 2 days.   Start Date: July 23, 2025     saccharomyces boulardii 250 MG capsule  Commonly known as: Florastor   250 mg, Oral, 2 Times Daily             Continue These Medications        Instructions Start Date   ALPRAZolam 0.25 MG tablet  Commonly known as: XANAX   1 tablet, Oral, Daily PRN      cholecalciferol 25 MCG (1000 UT) tablet  Commonly known as: VITAMIN D3   2,000 Units, Daily      cyclobenzaprine 10 MG tablet  Commonly known as: FLEXERIL   1 tablet, 2 Times Daily PRN      doxepin 10 MG capsule  Commonly known as: SINEquan   10 mg, Nightly      DULoxetine 60 MG capsule  Commonly known as: CYMBALTA   60 mg, 2 Times Daily      Estring 2 MG ring  Generic drug: Estradiol   INSERT 1 RING INTRAVAGINALLY EVERY 3 MONTHS AS DIRECTED      fluticasone 50 MCG/ACT nasal spray  Commonly known as: FLONASE   into each nostril.      gabapentin 300 MG capsule  Commonly known as: NEURONTIN   600 mg, Every Night at Bedtime      levothyroxine 75 MCG tablet  Commonly known as: SYNTHROID, LEVOTHROID   1 tablet, Daily      melatonin 5 MG tablet tablet   10 mg, Oral, Nightly      omeprazole 40 MG capsule  Commonly known as: priLOSEC   Take  by mouth. Take 1 tab po qd x 2 wk then qd prn reflux      raNITIdine 150 MG tablet  Commonly known as: ZANTAC   150 mg, 2 Times Daily      rizatriptan 10 MG tablet  Commonly known as: MAXALT   10 mg, Oral, Once As Needed, May repeat in 2 hours if needed       rosuvastatin 5 MG tablet  Commonly known as: CRESTOR   5 mg, Nightly             Stop These Medications      HYDROcodone-acetaminophen 5-325 MG per tablet  Commonly known  as: NORCO     simvastatin 20 MG tablet  Commonly known as: ZOCOR              Allergies   Allergen Reactions    Adhesive Tape      Surgical adhesives    Azathioprine     Remicade [Infliximab]     Ustekinumab (Murine)          Discharge Disposition:  Home or Self Care    Diet:  Hospital:  Diet Order   Procedures    Diet: Regular/House; Fluid Consistency: Thin (IDDSI 0)            CODE STATUS:    Code Status and Medical Interventions: CPR (Attempt to Resuscitate); Full Support   Ordered at: 07/22/25 4308     Code Status (Patient has no pulse and is not breathing):    CPR (Attempt to Resuscitate)     Medical Interventions (Patient has pulse or is breathing):    Full Support       No future appointments.    Additional Instructions for the Follow-ups that You Need to Schedule       Discharge Follow-up with PCP   As directed       Currently Documented PCP:    Phoebe Painting MD    PCP Phone Number:    [phone]     Follow Up Details: 1 week        Discharge Follow-up with Specified Provider: [facility]   As directed      To: [facility]   Follow Up Details: 10 days                      Ene Crystal MD  07/23/25      Time Spent on Discharge:  I spent  32  minutes on this discharge activity which included: face-to-face encounter with the patient, reviewing the data in the system, coordination of the care with the nursing staff as well as consultants, documentation, and entering orders.

## 2025-07-23 NOTE — CASE MANAGEMENT/SOCIAL WORK
Discharge Planning Assessment  [facility]     Patient Name: Stacie Lao  MRN: [id number]  Today's Date: 7/23/2025    Admit Date: 7/22/2025    Plan: Home at DC   Discharge Needs Assessment       Row Name 07/23/25 0950       Living Environment    People in Home spouse    Current Living Arrangements home       Discharge Needs Assessment    Equipment Currently Used at Home none    Equipment Needed After Discharge none    Discharge Coordination/Progress Denies current use of DME, HH outpt services.                   Discharge Plan       Row Name 07/23/25 0951       Plan    Plan Home at DC    Patient/Family in Agreement with Plan yes    Plan Comments I spoke with the pt. She denies any DC needs at this time.    Final Discharge Disposition Code 01 - home or self-care      Row Name 07/23/25 0818       Plan    Final Discharge Disposition Code 01 - home or self-care                  Continued Care and Services - Admitted Since 7/22/2025    No active coordination exists.       Expected Discharge Date and Time       Expected Discharge Date Expected Discharge Time    Jul 24, 2025            Demographic Summary    No documentation.                  Functional Status       Row Name 07/23/25 0950       Functional Status    Usual Activity Tolerance good       Functional Status, IADL    Medications independent    Meal Preparation independent    Housekeeping independent    Laundry independent    Shopping independent                   Psychosocial    No documentation.                  Abuse/Neglect    No documentation.                  Legal    No documentation.                  Substance Abuse    No documentation.                  Patient Forms    No documentation.                     Paulina Abdi RN

## 2025-07-28 LAB — C1INH SERPL-MCNC: 31 MG/DL (ref 21–39)
